# Patient Record
Sex: FEMALE | Race: WHITE | NOT HISPANIC OR LATINO | Employment: OTHER | URBAN - METROPOLITAN AREA
[De-identification: names, ages, dates, MRNs, and addresses within clinical notes are randomized per-mention and may not be internally consistent; named-entity substitution may affect disease eponyms.]

---

## 2020-11-03 ENCOUNTER — HOSPITAL ENCOUNTER (EMERGENCY)
Facility: CLINIC | Age: 70
Discharge: ANOTHER HOSPITAL | End: 2020-11-03
Attending: EMERGENCY MEDICINE | Admitting: EMERGENCY MEDICINE
Payer: COMMERCIAL

## 2020-11-03 ENCOUNTER — HOSPITAL ENCOUNTER (INPATIENT)
Facility: CLINIC | Age: 70
LOS: 1 days | Discharge: HOME | DRG: 066 | End: 2020-11-04
Attending: EMERGENCY MEDICINE | Admitting: PSYCHIATRY & NEUROLOGY
Payer: COMMERCIAL

## 2020-11-03 ENCOUNTER — APPOINTMENT (OUTPATIENT)
Dept: CT IMAGING | Facility: CLINIC | Age: 70
End: 2020-11-03
Attending: EMERGENCY MEDICINE
Payer: COMMERCIAL

## 2020-11-03 ENCOUNTER — APPOINTMENT (OUTPATIENT)
Dept: MRI IMAGING | Facility: CLINIC | Age: 70
DRG: 066 | End: 2020-11-03
Attending: EMERGENCY MEDICINE
Payer: COMMERCIAL

## 2020-11-03 VITALS
OXYGEN SATURATION: 98 % | DIASTOLIC BLOOD PRESSURE: 122 MMHG | TEMPERATURE: 97.4 F | RESPIRATION RATE: 20 BRPM | HEART RATE: 90 BPM | BODY MASS INDEX: 32.44 KG/M2 | SYSTOLIC BLOOD PRESSURE: 213 MMHG | WEIGHT: 189 LBS

## 2020-11-03 DIAGNOSIS — I63.519 CEREBROVASCULAR ACCIDENT (CVA) DUE TO OCCLUSION OF MIDDLE CEREBRAL ARTERY, UNSPECIFIED BLOOD VESSEL LATERALITY (H): Primary | ICD-10-CM

## 2020-11-03 DIAGNOSIS — Z20.828 EXPOSURE TO SARS-ASSOCIATED CORONAVIRUS: ICD-10-CM

## 2020-11-03 DIAGNOSIS — F17.210 CIGARETTE SMOKER: ICD-10-CM

## 2020-11-03 DIAGNOSIS — I10 ACCELERATED HYPERTENSION: ICD-10-CM

## 2020-11-03 DIAGNOSIS — S27.321A CONTUSION OF RIGHT LUNG, INITIAL ENCOUNTER: ICD-10-CM

## 2020-11-03 DIAGNOSIS — I63.9 CEREBROVASCULAR ACCIDENT (CVA), UNSPECIFIED MECHANISM (H): ICD-10-CM

## 2020-11-03 DIAGNOSIS — S27.329A CONTUSION OF LUNG, UNSPECIFIED LATERALITY, INITIAL ENCOUNTER: ICD-10-CM
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
## 2020-11-03 LAB
ALBUMIN SERPL-MCNC: 3.6 G/DL (ref 3.4–5)
ALP SERPL-CCNC: 88 U/L (ref 40–150)
ALT SERPL W P-5'-P-CCNC: 18 U/L (ref 0–50)
ANION GAP SERPL CALCULATED.3IONS-SCNC: 5 MMOL/L (ref 3–14)
APTT PPP: 29 SEC (ref 22–37)
AST SERPL W P-5'-P-CCNC: 18 U/L (ref 0–45)
BASOPHILS # BLD AUTO: 0 10E9/L (ref 0–0.2)
BASOPHILS NFR BLD AUTO: 0.4 %
BILIRUB SERPL-MCNC: 0.8 MG/DL (ref 0.2–1.3)
BUN SERPL-MCNC: 13 MG/DL (ref 7–30)
CALCIUM SERPL-MCNC: 9.1 MG/DL (ref 8.5–10.1)
CHLORIDE SERPL-SCNC: 110 MMOL/L (ref 94–109)
CO2 SERPL-SCNC: 26 MMOL/L (ref 20–32)
CREAT SERPL-MCNC: 0.95 MG/DL (ref 0.52–1.04)
DIFFERENTIAL METHOD BLD: NORMAL
EOSINOPHIL NFR BLD AUTO: 0.9 %
ERYTHROCYTE [DISTWIDTH] IN BLOOD BY AUTOMATED COUNT: 14.1 % (ref 10–15)
ETHANOL SERPL-MCNC: <0.01 G/DL
GFR SERPL CREATININE-BSD FRML MDRD: 60 ML/MIN/{1.73_M2}
GLUCOSE BLDC GLUCOMTR-MCNC: 80 MG/DL (ref 70–99)
GLUCOSE BLDC GLUCOMTR-MCNC: 85 MG/DL (ref 70–99)
GLUCOSE SERPL-MCNC: 98 MG/DL (ref 70–99)
HBA1C MFR BLD: 5.8 % (ref 0–5.6)
HCT VFR BLD AUTO: 45.5 % (ref 35–47)
HGB BLD-MCNC: 14.8 G/DL (ref 11.7–15.7)
IMM GRANULOCYTES # BLD: 0 10E9/L (ref 0–0.4)
IMM GRANULOCYTES NFR BLD: 0.3 %
INR PPP: 0.98 (ref 0.86–1.14)
LYMPHOCYTES # BLD AUTO: 1.3 10E9/L (ref 0.8–5.3)
LYMPHOCYTES NFR BLD AUTO: 18.9 %
MCH RBC QN AUTO: 29.1 PG (ref 26.5–33)
MCHC RBC AUTO-ENTMCNC: 32.5 G/DL (ref 31.5–36.5)
MCV RBC AUTO: 90 FL (ref 78–100)
MONOCYTES # BLD AUTO: 0.4 10E9/L (ref 0–1.3)
MONOCYTES NFR BLD AUTO: 6 %
NEUTROPHILS # BLD AUTO: 4.9 10E9/L (ref 1.6–8.3)
NEUTROPHILS NFR BLD AUTO: 73.5 %
NRBC # BLD AUTO: 0 10*3/UL
NRBC BLD AUTO-RTO: 0 /100
PLATELET # BLD AUTO: 244 10E9/L (ref 150–450)
POTASSIUM SERPL-SCNC: 3.9 MMOL/L (ref 3.4–5.3)
PROT SERPL-MCNC: 7 G/DL (ref 6.8–8.8)
RADIOLOGIST FLAGS: ABNORMAL
RBC # BLD AUTO: 5.08 10E12/L (ref 3.8–5.2)
SODIUM SERPL-SCNC: 141 MMOL/L (ref 133–144)
TROPONIN I SERPL-MCNC: <0.015 UG/L (ref 0–0.04)
TSH SERPL DL<=0.005 MIU/L-ACNC: 1.68 MU/L (ref 0.4–4)
WBC # BLD AUTO: 6.7 10E9/L (ref 4–11)

## 2020-11-03 PROCEDURE — 250N000011 HC RX IP 250 OP 636: Performed by: STUDENT IN AN ORGANIZED HEALTH CARE EDUCATION/TRAINING PROGRAM

## 2020-11-03 PROCEDURE — 99291 CRITICAL CARE FIRST HOUR: CPT | Mod: 25 | Performed by: EMERGENCY MEDICINE

## 2020-11-03 PROCEDURE — 250N000011 HC RX IP 250 OP 636: Performed by: EMERGENCY MEDICINE

## 2020-11-03 PROCEDURE — 250N000013 HC RX MED GY IP 250 OP 250 PS 637: Performed by: STUDENT IN AN ORGANIZED HEALTH CARE EDUCATION/TRAINING PROGRAM

## 2020-11-03 PROCEDURE — 999N001017 HC STATISTIC GLUCOSE BY METER IP

## 2020-11-03 PROCEDURE — 99285 EMERGENCY DEPT VISIT HI MDM: CPT | Mod: 25

## 2020-11-03 PROCEDURE — 85025 COMPLETE CBC W/AUTO DIFF WBC: CPT | Performed by: EMERGENCY MEDICINE

## 2020-11-03 PROCEDURE — 80053 COMPREHEN METABOLIC PANEL: CPT | Performed by: EMERGENCY MEDICINE

## 2020-11-03 PROCEDURE — 83036 HEMOGLOBIN GLYCOSYLATED A1C: CPT | Performed by: EMERGENCY MEDICINE

## 2020-11-03 PROCEDURE — 120N000002 HC R&B MED SURG/OB UMMC

## 2020-11-03 PROCEDURE — U0003 INFECTIOUS AGENT DETECTION BY NUCLEIC ACID (DNA OR RNA); SEVERE ACUTE RESPIRATORY SYNDROME CORONAVIRUS 2 (SARS-COV-2) (CORONAVIRUS DISEASE [COVID-19]), AMPLIFIED PROBE TECHNIQUE, MAKING USE OF HIGH THROUGHPUT TECHNOLOGIES AS DESCRIBED BY CMS-2020-01-R: HCPCS | Performed by: EMERGENCY MEDICINE

## 2020-11-03 PROCEDURE — 250N000011 HC RX IP 250 OP 636: Performed by: RADIOLOGY

## 2020-11-03 PROCEDURE — 84484 ASSAY OF TROPONIN QUANT: CPT | Performed by: EMERGENCY MEDICINE

## 2020-11-03 PROCEDURE — 70450 CT HEAD/BRAIN W/O DYE: CPT

## 2020-11-03 PROCEDURE — 71260 CT THORAX DX C+: CPT

## 2020-11-03 PROCEDURE — 99221 1ST HOSP IP/OBS SF/LOW 40: CPT | Performed by: PHYSICIAN ASSISTANT

## 2020-11-03 PROCEDURE — 85610 PROTHROMBIN TIME: CPT | Performed by: EMERGENCY MEDICINE

## 2020-11-03 PROCEDURE — 70551 MRI BRAIN STEM W/O DYE: CPT | Mod: 26 | Performed by: RADIOLOGY

## 2020-11-03 PROCEDURE — 80320 DRUG SCREEN QUANTALCOHOLS: CPT | Performed by: EMERGENCY MEDICINE

## 2020-11-03 PROCEDURE — 70551 MRI BRAIN STEM W/O DYE: CPT

## 2020-11-03 PROCEDURE — 250N000009 HC RX 250: Performed by: RADIOLOGY

## 2020-11-03 PROCEDURE — 96374 THER/PROPH/DIAG INJ IV PUSH: CPT | Mod: 59 | Performed by: EMERGENCY MEDICINE

## 2020-11-03 PROCEDURE — 93005 ELECTROCARDIOGRAM TRACING: CPT | Performed by: EMERGENCY MEDICINE

## 2020-11-03 PROCEDURE — 84443 ASSAY THYROID STIM HORMONE: CPT | Performed by: EMERGENCY MEDICINE

## 2020-11-03 PROCEDURE — 85730 THROMBOPLASTIN TIME PARTIAL: CPT | Performed by: EMERGENCY MEDICINE

## 2020-11-03 PROCEDURE — 99285 EMERGENCY DEPT VISIT HI MDM: CPT | Mod: 25 | Performed by: EMERGENCY MEDICINE

## 2020-11-03 PROCEDURE — 96376 TX/PRO/DX INJ SAME DRUG ADON: CPT | Performed by: EMERGENCY MEDICINE

## 2020-11-03 PROCEDURE — 36415 COLL VENOUS BLD VENIPUNCTURE: CPT | Performed by: EMERGENCY MEDICINE

## 2020-11-03 PROCEDURE — 93010 ELECTROCARDIOGRAM REPORT: CPT | Performed by: EMERGENCY MEDICINE

## 2020-11-03 PROCEDURE — 99285 EMERGENCY DEPT VISIT HI MDM: CPT | Performed by: EMERGENCY MEDICINE

## 2020-11-03 PROCEDURE — 70496 CT ANGIOGRAPHY HEAD: CPT

## 2020-11-03 PROCEDURE — 250N000009 HC RX 250: Performed by: STUDENT IN AN ORGANIZED HEALTH CARE EDUCATION/TRAINING PROGRAM

## 2020-11-03 RX ORDER — POLYETHYLENE GLYCOL 3350 17 G/17G
17 POWDER, FOR SOLUTION ORAL DAILY
Status: DISCONTINUED | OUTPATIENT
Start: 2020-11-03 | End: 2020-11-03

## 2020-11-03 RX ORDER — SODIUM CHLORIDE, SODIUM GLUCONATE, SODIUM ACETATE, POTASSIUM CHLORIDE AND MAGNESIUM CHLORIDE 526; 502; 368; 37; 30 MG/100ML; MG/100ML; MG/100ML; MG/100ML; MG/100ML
83 INJECTION, SOLUTION INTRAVENOUS CONTINUOUS
Status: DISCONTINUED | OUTPATIENT
Start: 2020-11-03 | End: 2020-11-04 | Stop reason: HOSPADM

## 2020-11-03 RX ORDER — AMOXICILLIN 250 MG
1 CAPSULE ORAL 2 TIMES DAILY
Status: CANCELLED | OUTPATIENT
Start: 2020-11-03

## 2020-11-03 RX ORDER — HEPARIN SODIUM 5000 [USP'U]/.5ML
5000 INJECTION, SOLUTION INTRAVENOUS; SUBCUTANEOUS EVERY 8 HOURS
Status: DISCONTINUED | OUTPATIENT
Start: 2020-11-03 | End: 2020-11-04 | Stop reason: HOSPADM

## 2020-11-03 RX ORDER — POLYETHYLENE GLYCOL 3350 17 G/17G
17 POWDER, FOR SOLUTION ORAL DAILY
Status: DISCONTINUED | OUTPATIENT
Start: 2020-11-04 | End: 2020-11-04 | Stop reason: HOSPADM

## 2020-11-03 RX ORDER — IOPAMIDOL 755 MG/ML
500 INJECTION, SOLUTION INTRAVASCULAR ONCE
Status: COMPLETED | OUTPATIENT
Start: 2020-11-03 | End: 2020-11-03

## 2020-11-03 RX ORDER — AMOXICILLIN 250 MG
2 CAPSULE ORAL 2 TIMES DAILY
Status: CANCELLED | OUTPATIENT
Start: 2020-11-03

## 2020-11-03 RX ORDER — LABETALOL 20 MG/4 ML (5 MG/ML) INTRAVENOUS SYRINGE
10-20 EVERY 10 MIN PRN
Status: DISCONTINUED | OUTPATIENT
Start: 2020-11-03 | End: 2020-11-04 | Stop reason: HOSPADM

## 2020-11-03 RX ORDER — ASPIRIN 300 MG/1
300 SUPPOSITORY RECTAL ONCE
Status: COMPLETED | OUTPATIENT
Start: 2020-11-03 | End: 2020-11-03

## 2020-11-03 RX ORDER — HYDRALAZINE HYDROCHLORIDE 20 MG/ML
10 INJECTION INTRAMUSCULAR; INTRAVENOUS
Status: DISCONTINUED | OUTPATIENT
Start: 2020-11-03 | End: 2020-11-03 | Stop reason: HOSPADM

## 2020-11-03 RX ADMIN — ASPIRIN 300 MG: 300 SUPPOSITORY RECTAL at 19:37

## 2020-11-03 RX ADMIN — SODIUM CHLORIDE, SODIUM GLUCONATE, SODIUM ACETATE, POTASSIUM CHLORIDE AND MAGNESIUM CHLORIDE 83 ML/HR: 526; 502; 368; 37; 30 INJECTION, SOLUTION INTRAVENOUS at 22:45

## 2020-11-03 RX ADMIN — HEPARIN SODIUM 5000 UNITS: 5000 INJECTION, SOLUTION INTRAVENOUS; SUBCUTANEOUS at 20:51

## 2020-11-03 RX ADMIN — HYDRALAZINE HYDROCHLORIDE 10 MG: 20 INJECTION INTRAMUSCULAR; INTRAVENOUS at 12:52

## 2020-11-03 RX ADMIN — SODIUM CHLORIDE 100 ML: 9 INJECTION, SOLUTION INTRAVENOUS at 12:18

## 2020-11-03 RX ADMIN — HYDRALAZINE HYDROCHLORIDE 10 MG: 20 INJECTION INTRAMUSCULAR; INTRAVENOUS at 13:57

## 2020-11-03 RX ADMIN — IOPAMIDOL 95 ML: 755 INJECTION, SOLUTION INTRAVENOUS at 12:18
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
## 2020-11-03 NOTE — ED NOTES
Dr. Smith updated with patient's BP status. meds and overall status. Awaiting transfer. Shira Judd RN
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
## 2020-11-03 NOTE — ED NOTES
Patient and daughter, Krysta updated with lab results and CT results pending. She has no further sx, speech remains slurred. Shira Judd RN

## 2020-11-04 ENCOUNTER — APPOINTMENT (OUTPATIENT)
Dept: CARDIOLOGY | Facility: CLINIC | Age: 70
DRG: 066 | End: 2020-11-04
Attending: STUDENT IN AN ORGANIZED HEALTH CARE EDUCATION/TRAINING PROGRAM
Payer: COMMERCIAL

## 2020-11-04 ENCOUNTER — APPOINTMENT (OUTPATIENT)
Dept: CARDIOLOGY | Facility: CLINIC | Age: 70
DRG: 066 | End: 2020-11-04
Payer: COMMERCIAL

## 2020-11-04 ENCOUNTER — APPOINTMENT (OUTPATIENT)
Dept: OCCUPATIONAL THERAPY | Facility: CLINIC | Age: 70
DRG: 066 | End: 2020-11-04
Payer: COMMERCIAL

## 2020-11-04 ENCOUNTER — APPOINTMENT (OUTPATIENT)
Dept: PHYSICAL THERAPY | Facility: CLINIC | Age: 70
DRG: 066 | End: 2020-11-04
Payer: COMMERCIAL

## 2020-11-04 ENCOUNTER — PATIENT OUTREACH (OUTPATIENT)
Dept: CARE COORDINATION | Facility: CLINIC | Age: 70
End: 2020-11-04

## 2020-11-04 ENCOUNTER — APPOINTMENT (OUTPATIENT)
Dept: SPEECH THERAPY | Facility: CLINIC | Age: 70
DRG: 066 | End: 2020-11-04
Payer: COMMERCIAL

## 2020-11-04 VITALS
OXYGEN SATURATION: 100 % | BODY MASS INDEX: 31.45 KG/M2 | DIASTOLIC BLOOD PRESSURE: 83 MMHG | HEART RATE: 83 BPM | TEMPERATURE: 98.9 F | RESPIRATION RATE: 16 BRPM | SYSTOLIC BLOOD PRESSURE: 156 MMHG | HEIGHT: 65 IN

## 2020-11-04 LAB
ALBUMIN SERPL-MCNC: 3 G/DL (ref 3.4–5)
ALP SERPL-CCNC: 79 U/L (ref 40–150)
ALT SERPL W P-5'-P-CCNC: 18 U/L (ref 0–50)
ANION GAP SERPL CALCULATED.3IONS-SCNC: 8 MMOL/L (ref 3–14)
AST SERPL W P-5'-P-CCNC: 19 U/L (ref 0–45)
BILIRUB SERPL-MCNC: 0.8 MG/DL (ref 0.2–1.3)
BUN SERPL-MCNC: 13 MG/DL (ref 7–30)
CALCIUM SERPL-MCNC: 8.8 MG/DL (ref 8.5–10.1)
CHLORIDE SERPL-SCNC: 112 MMOL/L (ref 94–109)
CHOLEST SERPL-MCNC: 169 MG/DL
CO2 SERPL-SCNC: 22 MMOL/L (ref 20–32)
CREAT SERPL-MCNC: 0.89 MG/DL (ref 0.52–1.04)
GFR SERPL CREATININE-BSD FRML MDRD: 66 ML/MIN/{1.73_M2}
GLUCOSE BLDC GLUCOMTR-MCNC: 107 MG/DL (ref 70–99)
GLUCOSE BLDC GLUCOMTR-MCNC: 147 MG/DL (ref 70–99)
GLUCOSE BLDC GLUCOMTR-MCNC: 71 MG/DL (ref 70–99)
GLUCOSE BLDC GLUCOMTR-MCNC: 78 MG/DL (ref 70–99)
GLUCOSE BLDC GLUCOMTR-MCNC: 82 MG/DL (ref 70–99)
GLUCOSE SERPL-MCNC: 80 MG/DL (ref 70–99)
HDLC SERPL-MCNC: 49 MG/DL
LDLC SERPL CALC-MCNC: 104 MG/DL
NONHDLC SERPL-MCNC: 120 MG/DL
POTASSIUM SERPL-SCNC: 3.8 MMOL/L (ref 3.4–5.3)
PROT SERPL-MCNC: 6.2 G/DL (ref 6.8–8.8)
SARS-COV-2 RNA SPEC QL NAA+PROBE: NOT DETECTED
SODIUM SERPL-SCNC: 142 MMOL/L (ref 133–144)
SPECIMEN SOURCE: NORMAL
TRIGL SERPL-MCNC: 81 MG/DL

## 2020-11-04 PROCEDURE — 80053 COMPREHEN METABOLIC PANEL: CPT | Performed by: PSYCHIATRY & NEUROLOGY

## 2020-11-04 PROCEDURE — 250N000013 HC RX MED GY IP 250 OP 250 PS 637: Performed by: PSYCHIATRY & NEUROLOGY

## 2020-11-04 PROCEDURE — 36415 COLL VENOUS BLD VENIPUNCTURE: CPT | Performed by: PSYCHIATRY & NEUROLOGY

## 2020-11-04 PROCEDURE — 97161 PT EVAL LOW COMPLEX 20 MIN: CPT | Mod: GP

## 2020-11-04 PROCEDURE — 250N000013 HC RX MED GY IP 250 OP 250 PS 637: Performed by: STUDENT IN AN ORGANIZED HEALTH CARE EDUCATION/TRAINING PROGRAM

## 2020-11-04 PROCEDURE — 258N000001 HC RX 258: Performed by: STUDENT IN AN ORGANIZED HEALTH CARE EDUCATION/TRAINING PROGRAM

## 2020-11-04 PROCEDURE — 97165 OT EVAL LOW COMPLEX 30 MIN: CPT | Mod: GO | Performed by: OCCUPATIONAL THERAPIST

## 2020-11-04 PROCEDURE — 999N001017 HC STATISTIC GLUCOSE BY METER IP

## 2020-11-04 PROCEDURE — 255N000002 HC RX 255 OP 636: Performed by: STUDENT IN AN ORGANIZED HEALTH CARE EDUCATION/TRAINING PROGRAM

## 2020-11-04 PROCEDURE — 99223 1ST HOSP IP/OBS HIGH 75: CPT | Mod: GC | Performed by: PSYCHIATRY & NEUROLOGY

## 2020-11-04 PROCEDURE — 97116 GAIT TRAINING THERAPY: CPT | Mod: GP

## 2020-11-04 PROCEDURE — 97530 THERAPEUTIC ACTIVITIES: CPT | Mod: GO | Performed by: OCCUPATIONAL THERAPIST

## 2020-11-04 PROCEDURE — 80061 LIPID PANEL: CPT | Performed by: PSYCHIATRY & NEUROLOGY

## 2020-11-04 PROCEDURE — 93270 REMOTE 30 DAY ECG REV/REPORT: CPT

## 2020-11-04 PROCEDURE — 93228 REMOTE 30 DAY ECG REV/REPORT: CPT | Performed by: INTERNAL MEDICINE

## 2020-11-04 PROCEDURE — 92507 TX SP LANG VOICE COMM INDIV: CPT | Mod: GN

## 2020-11-04 PROCEDURE — 99232 SBSQ HOSP IP/OBS MODERATE 35: CPT | Performed by: PHYSICIAN ASSISTANT

## 2020-11-04 PROCEDURE — 93306 TTE W/DOPPLER COMPLETE: CPT | Mod: 26 | Performed by: INTERNAL MEDICINE

## 2020-11-04 PROCEDURE — 99407 BEHAV CHNG SMOKING > 10 MIN: CPT

## 2020-11-04 PROCEDURE — 92610 EVALUATE SWALLOWING FUNCTION: CPT | Mod: GN

## 2020-11-04 PROCEDURE — 97535 SELF CARE MNGMENT TRAINING: CPT | Mod: GO | Performed by: OCCUPATIONAL THERAPIST

## 2020-11-04 PROCEDURE — 999N000033 HC STATISTIC CHRONIC PULMONARY DISEASE SPECIALIST

## 2020-11-04 PROCEDURE — 92523 SPEECH SOUND LANG COMPREHEN: CPT | Mod: GN

## 2020-11-04 PROCEDURE — 250N000011 HC RX IP 250 OP 636: Performed by: STUDENT IN AN ORGANIZED HEALTH CARE EDUCATION/TRAINING PROGRAM

## 2020-11-04 RX ORDER — ATORVASTATIN CALCIUM 40 MG/1
40 TABLET, FILM COATED ORAL EVERY EVENING
Status: DISCONTINUED | OUTPATIENT
Start: 2020-11-04 | End: 2020-11-04 | Stop reason: HOSPADM

## 2020-11-04 RX ORDER — ATORVASTATIN CALCIUM 40 MG/1
40 TABLET, FILM COATED ORAL EVERY EVENING
Qty: 30 TABLET | Refills: 1 | Status: SHIPPED | OUTPATIENT
Start: 2020-11-04 | End: 2020-12-07

## 2020-11-04 RX ORDER — ASPIRIN 81 MG/1
81 TABLET, CHEWABLE ORAL DAILY
Qty: 30 TABLET | Refills: 1 | Status: SHIPPED | OUTPATIENT
Start: 2020-11-05 | End: 2020-12-07

## 2020-11-04 RX ORDER — LISINOPRIL 5 MG/1
5 TABLET ORAL DAILY
Status: DISCONTINUED | OUTPATIENT
Start: 2020-11-04 | End: 2020-11-04 | Stop reason: HOSPADM

## 2020-11-04 RX ORDER — CLOPIDOGREL BISULFATE 75 MG/1
75 TABLET ORAL DAILY
Status: DISCONTINUED | OUTPATIENT
Start: 2020-11-05 | End: 2020-11-04 | Stop reason: HOSPADM

## 2020-11-04 RX ORDER — NICOTINE 21 MG/24HR
1 PATCH, TRANSDERMAL 24 HOURS TRANSDERMAL EVERY 24 HOURS
Qty: 30 PATCH | Refills: 11 | Status: SHIPPED | OUTPATIENT
Start: 2020-11-04 | End: 2020-11-24

## 2020-11-04 RX ORDER — CLOPIDOGREL BISULFATE 75 MG/1
75 TABLET ORAL DAILY
Qty: 30 TABLET | Refills: 1 | Status: SHIPPED | OUTPATIENT
Start: 2020-11-05 | End: 2020-12-07

## 2020-11-04 RX ORDER — CLOPIDOGREL BISULFATE 75 MG/1
300 TABLET ORAL ONCE
Status: COMPLETED | OUTPATIENT
Start: 2020-11-04 | End: 2020-11-04

## 2020-11-04 RX ORDER — NICOTINE POLACRILEX 4 MG
15-30 LOZENGE BUCCAL
Status: DISCONTINUED | OUTPATIENT
Start: 2020-11-04 | End: 2020-11-04 | Stop reason: HOSPADM

## 2020-11-04 RX ORDER — ACYCLOVIR 200 MG/1
10 CAPSULE ORAL ONCE
Status: COMPLETED | OUTPATIENT
Start: 2020-11-04 | End: 2020-11-04

## 2020-11-04 RX ORDER — LISINOPRIL 5 MG/1
5 TABLET ORAL DAILY
Qty: 30 TABLET | Refills: 1 | Status: SHIPPED | OUTPATIENT
Start: 2020-11-04 | End: 2020-11-24

## 2020-11-04 RX ORDER — ASPIRIN 81 MG/1
81 TABLET, CHEWABLE ORAL DAILY
Status: DISCONTINUED | OUTPATIENT
Start: 2020-11-04 | End: 2020-11-04 | Stop reason: HOSPADM

## 2020-11-04 RX ORDER — DEXTROSE MONOHYDRATE 25 G/50ML
25-50 INJECTION, SOLUTION INTRAVENOUS
Status: DISCONTINUED | OUTPATIENT
Start: 2020-11-04 | End: 2020-11-04 | Stop reason: HOSPADM

## 2020-11-04 RX ORDER — POLYETHYLENE GLYCOL 3350 17 G
2 POWDER IN PACKET (EA) ORAL
Qty: 30 LOZENGE | Refills: 11 | Status: SHIPPED | OUTPATIENT
Start: 2020-11-04 | End: 2020-11-24

## 2020-11-04 RX ADMIN — LISINOPRIL 5 MG: 5 TABLET ORAL at 12:53

## 2020-11-04 RX ADMIN — CLOPIDOGREL BISULFATE 300 MG: 75 TABLET ORAL at 10:06

## 2020-11-04 RX ADMIN — POLYETHYLENE GLYCOL 3350 17 G: 17 POWDER, FOR SOLUTION ORAL at 10:06

## 2020-11-04 RX ADMIN — HEPARIN SODIUM 5000 UNITS: 5000 INJECTION, SOLUTION INTRAVENOUS; SUBCUTANEOUS at 12:54

## 2020-11-04 RX ADMIN — SODIUM CHLORIDE 10 ML: 9 INJECTION, SOLUTION INTRAMUSCULAR; INTRAVENOUS; SUBCUTANEOUS at 10:32

## 2020-11-04 RX ADMIN — HUMAN ALBUMIN MICROSPHERES AND PERFLUTREN 5 ML: 10; .22 INJECTION, SOLUTION INTRAVENOUS at 11:00

## 2020-11-04 RX ADMIN — HEPARIN SODIUM 5000 UNITS: 5000 INJECTION, SOLUTION INTRAVENOUS; SUBCUTANEOUS at 04:31

## 2020-11-04 RX ADMIN — ASPIRIN 81 MG: 81 TABLET, CHEWABLE ORAL at 10:06

## 2020-11-04 ASSESSMENT — ACTIVITIES OF DAILY LIVING (ADL)
PREVIOUS_RESPONSIBILITIES: MEAL PREP;HOUSEKEEPING;LAUNDRY;SHOPPING;YARDWORK;MEDICATION MANAGEMENT;FINANCES;DRIVING
ADLS_ACUITY_SCORE: 17
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
## 2020-11-04 NOTE — PLAN OF CARE
Your risk factors for stroke or TIA (transient ischemic attack):    Your Risk Factors Your Results Normal Ranges   High blood pressure BP Readings from Last 1 Encounters:   11/04/20 (!) 157/98    Less than 120/80   Cholesterol              Total Lab Results   Component Value Date    CHOL 169 11/04/2020      Less than 150    Triglycerides   Lab Results   Component Value Date    TRIG 81 11/04/2020    Less than 150   LDL Lab Results   Component Value Date     11/04/2020       Less than 70   HDL Lab Results   Component Value Date    HDL 49 11/04/2020            Greater than 40 (men)  Greater than 50 (women)   Diabetes Recent Labs   Lab 11/04/20  0631   GLC 80    Fasting blood glucose    Smoking/tobacco use  Quit smoking and tobacco   Overweight  Lose 1-2 pounds a week   Lack of exercise  30 minutes moderate activity each day   Other risk factors include carotid (neck) artery disease, atrial fibrillation and stress. You may be on new medicine to treat high blood pressure, cholesterol, diabetes or atrial fibrillation.    Understanding Stroke Booklet given to patient. Please refer to booklet for further information.    Stroke warning signs and symptoms - CALL 911 right away for:  - Sudden numbness or weakness in the face, arm or leg (often on one side of the body).  - Sudden confusion or trouble understanding what is going on.  - Sudden blurred or decreased vision in one or both eyes.  - Sudden trouble speaking, loss of balance, dizziness or problems with coordination.  - Sudden, severe headache for no reason.  - Fainting or seizures.  - Symptoms may go away then come back suddenly.       **Given to patient and discussed** Patient's specific risk factors include HTN, smoking, triglycerides, hyperlipidemia, overweight
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
## 2020-11-04 NOTE — CONSULTS
Brief Social Work Note    SW consult seen and acknowledged for discharge planning. Per PT/OT recommendations, pt is able to safely return home with OP PT/OT. No SW needs identified at this time. Please re-consult if further needs arise.    ENA Burris, [facility]  Adult Acute Care SW  Ph:[phone]  Pager [phone]
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
## 2020-11-04 NOTE — PROGRESS NOTES
Neuros unchanged. Discharge orders placed. PIV removed. Education gone over w/ pt and . Medications to be picked up in discharge pharmacy. EKG monitor placed before pt left.  to be 24 hr assist. W/c ride taken to front entrance. Son was to drive pt and  home.

## 2020-11-04 NOTE — PROGRESS NOTES
Successfully completed stroke teach-back, Session 1. Patient/family has the Stroke Handbook and/or handouts from the [facility] stroke class.
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
## 2020-11-04 NOTE — PLAN OF CARE
Stroke Education Note    The following information has been reviewed with the patient:    1. Warning signs of stroke    2. Calling 911 if having warning signs of stroke    3. All modifiable risk factors: hypertension, CAD, atrial fib, diabetes, hypercholesterolemia, smoking, substance abuse, diet, physical inactivity, obesity, sleep apnea    4. Patient's risk factors for stroke which include: HTN, smoking, hypercholesterolemia,diet, physical inactivity, obesity    5. Follow-up plan for after discharge: Home w/24hr assist from family w/outpatient PT/OT/Speech    6. Discharge medications which include: Lisinopril, Atorvastatin, Aspirin, Plavix    In addition, the Understanding Stroke Handbook has been given to the patient.    Learner's response to risk factors / lifestyle modification education: Desire to change     Attempted to call patient's son and  to review information w/no success    Karine Blair RN

## 2020-11-04 NOTE — PROGRESS NOTES
[facility]     Stroke Progress Note    Interval Events  No acute overnight events  High systolic Bps (allowing permissive HTN)  Trauma signing off    Impression  Ischemic Stroke due to small-vessel occlusion  (Acute)  Previously unknown stroke, possibly due to embolism    Plan    #Acute Ischemic Stroke (without tPA) Plan, Left thalamic infarct  #Previously infarct of L. Frontal lobe, undetermined age, likely embolic  - Neurochecks Q 2 hours  - SBP goal < 140  - Avoid hypotonic IV fluids  - Statin:  - TTE with Bubble Study  - EKG (outside hospital)   -left atrial enlargement read  - Bedside Glucose Monitoring  - PT/OT/SLP  - Atorvastatin 40 mg at bedtime  - Asa 81 mg and clopidogrel 75 mg  qday for 21 days; ASA 81 mg qday thereafter  - 30 day cardiac monitoring on discharge  - Stroke Education  - Stroke Class per [facility] ([facility]  - Smoking cessation consult  - Depression Screen  - Apnea Screen  - Euthermia, Euglycemia    #HTN  Previously diagnosed but untreated  - Lisinopril 5mg as above    #HLD  - Atorvastatin 40 mg as above    Checklist:  FEN: Regular Diet, thin liquid per SLP  PPx:   - DVT: subQ heparin, SCDs  Disposition: Pending PT/OT recs  Code: Full    The patient was discussed with Stroke Fellow, Dr. Dennis.  The Stroke Staff is Dr. Joseph.    Marty Martin MD  Neurology Resident    ______________________________________________________    Medications   Home Meds  Prior to Admission medications    Not on File       Scheduled Meds    heparin ANTICOAGULANT  5,000 Units Subcutaneous Q8H     polyethylene glycol  17 g Oral or Feeding Tube Daily       Infusion Meds    - MEDICATION INSTRUCTIONS -       Plasma-Lyte A 83 mL/hr (11/03/20 9387)       PRN Meds  glucose **OR** dextrose **OR** glucagon, labetalol, - MEDICATION INSTRUCTIONS -       PHYSICAL EXAMINATION  Temp:  [97.4  F (36.3  C)-98.8  F (37.1  C)] 97.5  F (36.4  C)  Pulse:  [] 75  Resp:   [6-20] 18  BP: (137-213)/() 185/105  SpO2:  [94 %-99 %] 95 %     General:  patient lying in bed without any acute distress    HEENT:  normocephalic/atraumatic, poor dental hygiene, no epistaxis   Cardio:  RRR  Pulmonary:  no respiratory distress  Abdomen:  soft, non-tender, non-distended  Extremities:  No gross deformities  Skin:  intact, warm/dry      Neurologic  Mental Status:  alert, oriented x 3, follows commands, aphasic (expressive) speech, good vocal quality.  Cranial Nerves:  visual fields intact, PERRL, EOMI with normal pursuit, facial sensation intact and symmetric, hearing not formally tested but intact to conversation, palate elevation symmetric and uvula midline, no dysarthria, shoulder shrug strong bilaterally, tongue protrusion midline, minor right facial droop at corner of mouth.  Motor:  normal muscle tone and bulk, no abnormal movements, able to move all limbs spontaneously, strength 4+/5 on right shoulder abduction/adduction and right elbow flexion/extenion. 5/5 throughout remainder of upper and lower extremities, no pronator drift.  Reflexes:  2+ and symmetric throughout f upper and lower extremities, no clonus, not able to test toes due to pt cooperation.  Sensory:  light touch sensation intact and symmetric throughout upper and lower extremities  Coordination:  normal finger-to-nose and heel-to-shin bilaterally without dysmetria, rapid alternating movements symmetric  Station/Gait:  deferred    Stroke Scales    NIHSS  Interval baseline (11/03/20 1650)   Interval Comments     1a. Level of Consciousness 0-->Alert, keenly responsive   1b. LOC Questions 0-->Answers both questions correctly   1c. LOC Commands 0-->Performs both tasks correctly   2.   Best Gaze 0-->Normal   3.   Visual 0-->No visual loss   4.   Facial Palsy 1-->Minor paralysis (flattened nasolabial fold, asymmetry on smiling)   5a. Motor Arm, Left 0-->No drift, limb holds 90 (or 45) degrees for full 10 secs   5b. Motor Arm,  Right 0-->No drift, limb holds 90 (or 45) degrees for full 10 secs   6a. Motor Leg, Left 0-->No drift, leg holds 30 degree position for full 5 secs   6b. Motor Leg, right 0-->No drift, leg holds 30 degree position for full 5 secs   7.   Limb Ataxia 0-->Absent   8.   Sensory 0-->Normal, no sensory loss   9.   Best Language 1-->Mild-to-moderate aphasia, some obvious loss of fluency or facility of comprehension, without significant limitation on ideas expressed or form of expression. Reduction of speech and/or comprehension, however, makes conversation. . . (see row details)   10. Dysarthria 0-->Normal   11. Extinction and Inattention  0-->No abnormality   Total 2 (11/03/20 1650)       Imaging  I personally reviewed all imaging; relevant findings per HPI.     Lab Results Data   CBC  Recent Labs   Lab 11/03/20  1150   WBC 6.7   RBC 5.08   HGB 14.8   HCT 45.5        Basic Metabolic Panel    Recent Labs   Lab 11/04/20  0631 11/03/20  1150    141   POTASSIUM 3.8 3.9   CHLORIDE 112* 110*   CO2 22 26   BUN 13 13   CR 0.89 0.95   GLC 80 98   JOSH 8.8 9.1     Liver Panel  Recent Labs   Lab 11/04/20  0631 11/03/20  1150   PROTTOTAL 6.2* 7.0   ALBUMIN 3.0* 3.6   BILITOTAL 0.8 0.8   ALKPHOS 79 88   AST 19 18   ALT 18 18     INR    Recent Labs   Lab Test 11/03/20  1150   INR 0.98      Lipid Profile    Recent Labs   Lab Test 11/04/20  0631   CHOL 169   HDL 49*   *   TRIG 81     A1C    Recent Labs   Lab Test 11/03/20  1547   A1C 5.8*     Troponin I    Recent Labs   Lab 11/03/20  1150   TROPI <0.015
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
## 2020-11-04 NOTE — PLAN OF CARE
Status: Admitted for thalmic stroke  VS: VSS on RA ex HTN w/in parameters  Neuros: A&Ox4. Mild aphasia w/word-finding difficulty; dysarthric speech. R side slightly weaker than L, 5/5 TO. Denies n/t. Some R neglect when walking  GI: Tolerating regular diet, some coughing w/thins, no straw recc. No BM this shift, BS+  : Voiding w/out difficulty  IV: PIV SL  Activity: SBA w/gb and walker. Walked on unit x2  Pain: Denies  Skin: WNL  Labs/Tests: Worked w/PT/OT/Speech. Echo completed. Stroke education completed at bedside, see notes  Social: Son updated via phone  Plan of care: Likely discharge home today.
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
## 2020-11-05 ENCOUNTER — TELEPHONE (OUTPATIENT)
Dept: RESPIRATORY THERAPY | Facility: CLINIC | Age: 70
End: 2020-11-05

## 2020-11-05 NOTE — TELEPHONE ENCOUNTER
Smoking Cessation Counseling Follow-Up Phone Call    Patient provided Smoking Cessation Consult during last hospitalization on 11/4/2020  Called patient today for smoking cessation counseling follow-up support.  answered the phone as patient was resting.  confirmed patient was discharged with nicotine patches and lozenges. States patient continues to be smoke free.     Encouraged patient to continue to:  -Use 14 mg patch daily for 2-6 weeks of smoking abstinence based on assessment of patients dependence level, then taper every 2-4 weeks in 7 mg steps as tolerated.   -Use 2 mg lozenges , take first thing in the morning and/as needed for cravings and urges to smoke. My be used every 1-2 hours.     Patient agreed to another monthly follow-up phone call.     Humera Sandhu RRT, CTTS  Chronic Pulmonary Disease Specialist  Office: [phone]  Pager: [phone]

## 2020-11-05 NOTE — PLAN OF CARE
Physical Therapy Discharge Summary    Reason for therapy discharge:    Discharged to home with outpatient therapy.    Progress towards therapy goal(s). See goals on Care Plan in [facility] electronic health record for goal details.  Goals partially met.  Barriers to achieving goals:   discharge from facility.    Therapy recommendation(s):    Continued therapy is recommended.  Rationale/Recommendations:  to progress functional mobility towards PLOF.
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
## 2020-11-05 NOTE — PLAN OF CARE
Speech Language Therapy Discharge Summary    Reason for therapy discharge:    Discharged to home with outpatient therapy.    Progress towards therapy goal(s). See goals on Care Plan in [facility] electronic health record for goal details.  Goals partially met.  Barriers to achieving goals:   discharge from facility.    Therapy recommendation(s):    Continued therapy is recommended.  Rationale/Recommendations:  Recommend ongoing ST targeting minimal-mild aphasia and moderate dysarthria. Also recommend f/u for dysphagia (regular diet/thin liquids).
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
## 2020-11-10 ENCOUNTER — HOSPITAL ENCOUNTER (OUTPATIENT)
Dept: SPEECH THERAPY | Facility: CLINIC | Age: 70
Setting detail: THERAPIES SERIES
End: 2020-11-10
Attending: STUDENT IN AN ORGANIZED HEALTH CARE EDUCATION/TRAINING PROGRAM
Payer: MEDICARE

## 2020-11-10 DIAGNOSIS — I63.519 CEREBROVASCULAR ACCIDENT (CVA) DUE TO OCCLUSION OF MIDDLE CEREBRAL ARTERY, UNSPECIFIED BLOOD VESSEL LATERALITY (H): ICD-10-CM

## 2020-11-10 PROCEDURE — 92523 SPEECH SOUND LANG COMPREHEN: CPT | Mod: GN | Performed by: SPEECH-LANGUAGE PATHOLOGIST

## 2020-11-10 NOTE — PROGRESS NOTES
[facility]          OUTPATIENT SPEECH LANGUAGE PATHOLOGY LANGUAGE-COGNITION  EVALUATION  PLAN OF TREATMENT FOR OUTPATIENT REHABILITATION  (COMPLETE FOR INITIAL CLAIMS ONLY)  Patient's Last Name, First Name, M.I.  YOB: 1950  Tiara Smith                        Provider s Name: [facility] Medical Record No.  [id number]     Onset Date:  11/03/20   Start of Care Date: 11/10/20   Type:     ___PT  __OT   _X_SLP    Medical Diagnosis:  Cerebrovascular accident (CVA) due to occlusion of middle cerebral artery, unspecified blood vessel laterality (H) I63.519     Speech Language Pathology Diagnosis:  mild expressive aphasia    Visits from SOC: 1                                        ________________________________________________________________________________  Plan of Treatment/Functional Goals:   Planned Therapy Interventions: Language   Intervention Comments: Patient will benefit from skilled speech-language therapy in order to  increase word finding abilities in order to functionally communicate across various settings and communication partners.        Language / Cognition Goals  1. Goal Identifier: Word finding       Goal Description: Patient will complete moderate level word finding task using compensatory word finding strategies with up to 90% accuracy given minimal cues.       Target Date: 12/24/20         Predicted Duration of Therapy Intervention (days/wks): 6 weeks    Megan Fowler, SLP       I CERTIFY THE NEED FOR THESE SERVICES FURNISHED UNDER        THIS PLAN OF TREATMENT AND WHILE UNDER MY CARE     (Physician co-signature of this document indicates review and certification of the therapy plan).                  Certification Date From:  11/10/20  Certification Date To:   12/24/20          Referring Physician:  Yan Ash MD    Initial  Assessment        See Epic Evaluation Start Of Care Date: 11/10/20
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
## 2020-11-11 ENCOUNTER — HOSPITAL ENCOUNTER (OUTPATIENT)
Dept: SPEECH THERAPY | Facility: CLINIC | Age: 70
Setting detail: THERAPIES SERIES
End: 2020-11-11
Attending: STUDENT IN AN ORGANIZED HEALTH CARE EDUCATION/TRAINING PROGRAM
Payer: MEDICARE

## 2020-11-11 ENCOUNTER — HOSPITAL ENCOUNTER (OUTPATIENT)
Dept: PHYSICAL THERAPY | Facility: CLINIC | Age: 70
Setting detail: THERAPIES SERIES
End: 2020-11-11
Attending: STUDENT IN AN ORGANIZED HEALTH CARE EDUCATION/TRAINING PROGRAM
Payer: MEDICARE

## 2020-11-11 DIAGNOSIS — I63.519 CEREBROVASCULAR ACCIDENT (CVA) DUE TO OCCLUSION OF MIDDLE CEREBRAL ARTERY, UNSPECIFIED BLOOD VESSEL LATERALITY (H): ICD-10-CM

## 2020-11-11 PROCEDURE — 97161 PT EVAL LOW COMPLEX 20 MIN: CPT | Mod: GP | Performed by: PHYSICAL THERAPIST

## 2020-11-11 PROCEDURE — 97110 THERAPEUTIC EXERCISES: CPT | Mod: GP | Performed by: PHYSICAL THERAPIST

## 2020-11-11 PROCEDURE — 92507 TX SP LANG VOICE COMM INDIV: CPT | Mod: GN | Performed by: SPEECH-LANGUAGE PATHOLOGIST

## 2020-11-11 NOTE — PROGRESS NOTES
[facility]        OUTPATIENT PHYSICAL THERAPY FUNCTIONAL EVALUATION  PLAN OF TREATMENT FOR OUTPATIENT REHABILITATION  (COMPLETE FOR INITIAL CLAIMS ONLY)  Patient's Last Name, First Name, M.I.  YOB: 1950  Tiara Smith     Provider's Name   [facility]   Medical Record No.  [id number]     Start of Care Date:  11/11/20   Onset Date:  11/03/20   Type:     _X__PT   ____OT  ____SLP Medical Diagnosis:  CVA I63.519     PT Diagnosis:  left CVA right lia 11/3/20  Visits from SOC:  1                              __________________________________________________________________________________  Plan of Treatment/Functional Goals:  ADL retraining, balance training, ROM, strengthening, stretching           GOALS  1  instruction in HEP and compliant with it 5 of 7 days to improve quality of life   02/11/21    2  patient to be able to stand and dress her pants with good safety and strength   02/11/21                                                                      Therapy Frequency:      Predicted Duration of Therapy Intervention:  every other week x 1-3 months     Luci Linares, PT                                    I CERTIFY THE NEED FOR THESE SERVICES FURNISHED UNDER        THIS PLAN OF TREATMENT AND WHILE UNDER MY CARE     (Physician co-signature of this document indicates review and certification of the therapy plan).                Certification Date From:  11/11/20   Certification Date To:  02/11/21    Referring Provider:  mitzy Ash MD    Initial Assessment  See Epic Evaluation- Start of Care Date: 11/11/20

## 2020-11-11 NOTE — PROGRESS NOTES
11/11/20 1335   Quick Adds   Quick Adds Certification   Type of Visit Initial OP PT Evaluation   General Information   Start of Care Date 11/11/20   Referring Physician mitzy Ash MD   Orders Evaluate and Treat as Indicated   Order Date 11/04/20   Medical Diagnosis CVA I63.519   Onset of illness/injury or Date of Surgery 11/03/20   Precautions/Limitations no known precautions/limitations   Surgical/Medical history reviewed Yes   Pertinent history of current problem (include personal factors and/or comorbidities that impact the POC) patient seen due to left CVA on 11/3/20 was in hospital 2 days , lives with  in [facility] and is independent , feels like right leg is alittle weak and right UE feels fine is having alittle more trouble with her swalling and speech and is seeing demetrio in speach and wants to see the OT about her driving . PMH none reported    Prior level of function comment walks daily    Current Community Support Family/friend caregiver   Patient role/Employment history Retired   Patient/Family Goals Statement speech and get right leg stronger and be able to drive    Fall Risk Screen   Fall screen completed by PT   Have you fallen 2 or more times in the past year? No   Have you fallen and had an injury in the past year? No   Is patient a fall risk? No   Cognitive Status Examination   Orientation orientation to person, place and time   Level of Consciousness alert   Follows Commands and Answers Questions 100% of the time   Strength   Strength Comments sit to stand 5x 24 seconds , right UE/LE  grossly 4-/5 left 4/5    Bed Mobility   Bed Mobility Comments independent    Transfer Skills   Transfer Comments independent    Planned Therapy Interventions   Planned Therapy Interventions ADL retraining;balance training;ROM;strengthening;stretching   Clinical Impression   Criteria for Skilled Therapeutic Interventions Met yes, treatment indicated   PT Diagnosis left CVA right lia 11/3/20    Functional  limitations due to impairments has trouble putting her pants on    Clinical Presentation Stable/Uncomplicated   Clinical Presentation Rationale patient seen s/p left CVA 11/3/20 presents with decrease strength in her right LE making it difficult to dress and she fatigues easy . Rx is exercises in clinic and HEP    Clinical Decision Making (Complexity) Low complexity   Predicted Duration of Therapy Intervention (days/wks) every other week x 1-3 months    Risk & Benefits of therapy have been explained Yes   Patient, Family & other staff in agreement with plan of care Yes   Education Assessment   Preferred Learning Style Listening;Reading;Demonstration   Barriers to Learning No barriers   GOALS   PT Eval Goals 1;2   Goal 1   Goal Identifier 1   Goal Description instruction in HEP and compliant with it 5 of 7 days to improve quality of life    Target Date 02/11/21   Goal 2   Goal Identifier 2   Goal Description patient to be able to stand and dress her pants with good safety and strength    Target Date 02/11/21   Total Evaluation Time   PT Eval, Low Complexity Minutes (63551) 15   Therapy Certification   Certification date from 11/11/20   Certification date to 02/11/21   Medical Diagnosis CVA I63.519   Certification I certify the need for these services furnished under this plan of treatment and while under my care.  (Physician co-signature of this document indicates review and certification of the therapy plan).

## 2020-11-17 ENCOUNTER — HOSPITAL ENCOUNTER (OUTPATIENT)
Dept: OCCUPATIONAL THERAPY | Facility: CLINIC | Age: 70
Setting detail: THERAPIES SERIES
End: 2020-11-17
Attending: STUDENT IN AN ORGANIZED HEALTH CARE EDUCATION/TRAINING PROGRAM
Payer: MEDICARE

## 2020-11-17 ENCOUNTER — HOSPITAL ENCOUNTER (OUTPATIENT)
Dept: SPEECH THERAPY | Facility: CLINIC | Age: 70
Setting detail: THERAPIES SERIES
End: 2020-11-17
Attending: STUDENT IN AN ORGANIZED HEALTH CARE EDUCATION/TRAINING PROGRAM
Payer: MEDICARE

## 2020-11-17 PROCEDURE — 92507 TX SP LANG VOICE COMM INDIV: CPT | Mod: GN | Performed by: SPEECH-LANGUAGE PATHOLOGIST

## 2020-11-17 PROCEDURE — 97165 OT EVAL LOW COMPLEX 30 MIN: CPT | Mod: GO
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
## 2020-11-24 ENCOUNTER — HOSPITAL ENCOUNTER (OUTPATIENT)
Dept: OCCUPATIONAL THERAPY | Facility: CLINIC | Age: 70
Setting detail: THERAPIES SERIES
End: 2020-11-24
Attending: STUDENT IN AN ORGANIZED HEALTH CARE EDUCATION/TRAINING PROGRAM
Payer: MEDICARE

## 2020-11-24 ENCOUNTER — HOSPITAL ENCOUNTER (OUTPATIENT)
Dept: SPEECH THERAPY | Facility: CLINIC | Age: 70
Setting detail: THERAPIES SERIES
End: 2020-11-24
Attending: STUDENT IN AN ORGANIZED HEALTH CARE EDUCATION/TRAINING PROGRAM
Payer: MEDICARE

## 2020-11-24 ENCOUNTER — OFFICE VISIT (OUTPATIENT)
Dept: FAMILY MEDICINE | Facility: CLINIC | Age: 70
End: 2020-11-24
Payer: MEDICARE

## 2020-11-24 ENCOUNTER — HOSPITAL ENCOUNTER (OUTPATIENT)
Dept: PHYSICAL THERAPY | Facility: CLINIC | Age: 70
Setting detail: THERAPIES SERIES
End: 2020-11-24
Attending: STUDENT IN AN ORGANIZED HEALTH CARE EDUCATION/TRAINING PROGRAM
Payer: MEDICARE

## 2020-11-24 VITALS
SYSTOLIC BLOOD PRESSURE: 144 MMHG | OXYGEN SATURATION: 100 % | RESPIRATION RATE: 16 BRPM | HEART RATE: 78 BPM | DIASTOLIC BLOOD PRESSURE: 92 MMHG | TEMPERATURE: 97.5 F | WEIGHT: 186 LBS | BODY MASS INDEX: 30.95 KG/M2

## 2020-11-24 DIAGNOSIS — I63.519 CEREBROVASCULAR ACCIDENT (CVA) DUE TO OCCLUSION OF MIDDLE CEREBRAL ARTERY, UNSPECIFIED BLOOD VESSEL LATERALITY (H): ICD-10-CM

## 2020-11-24 DIAGNOSIS — I10 HYPERTENSION GOAL BP (BLOOD PRESSURE) < 140/90: ICD-10-CM

## 2020-11-24 DIAGNOSIS — Z12.11 COLON CANCER SCREENING: ICD-10-CM

## 2020-11-24 DIAGNOSIS — Z78.0 POST-MENOPAUSE: ICD-10-CM

## 2020-11-24 DIAGNOSIS — Z23 NEED FOR VACCINATION: ICD-10-CM

## 2020-11-24 DIAGNOSIS — Z00.00 ENCOUNTER FOR MEDICARE ANNUAL WELLNESS EXAM: Primary | ICD-10-CM

## 2020-11-24 DIAGNOSIS — Z09 HOSPITAL DISCHARGE FOLLOW-UP: ICD-10-CM

## 2020-11-24 DIAGNOSIS — R32 URINARY INCONTINENCE, UNSPECIFIED TYPE: ICD-10-CM

## 2020-11-24 DIAGNOSIS — E78.5 HYPERLIPIDEMIA LDL GOAL <130: ICD-10-CM

## 2020-11-24 PROCEDURE — 97530 THERAPEUTIC ACTIVITIES: CPT | Mod: GO,59

## 2020-11-24 PROCEDURE — 97110 THERAPEUTIC EXERCISES: CPT | Mod: GO,59

## 2020-11-24 PROCEDURE — 99397 PER PM REEVAL EST PAT 65+ YR: CPT | Mod: 25 | Performed by: FAMILY MEDICINE

## 2020-11-24 PROCEDURE — 86803 HEPATITIS C AB TEST: CPT | Performed by: FAMILY MEDICINE

## 2020-11-24 PROCEDURE — 92507 TX SP LANG VOICE COMM INDIV: CPT | Mod: GN | Performed by: SPEECH-LANGUAGE PATHOLOGIST

## 2020-11-24 PROCEDURE — 99214 OFFICE O/P EST MOD 30 MIN: CPT | Mod: 25 | Performed by: FAMILY MEDICINE

## 2020-11-24 PROCEDURE — 90715 TDAP VACCINE 7 YRS/> IM: CPT | Performed by: FAMILY MEDICINE

## 2020-11-24 PROCEDURE — G0009 ADMIN PNEUMOCOCCAL VACCINE: HCPCS | Mod: 59 | Performed by: FAMILY MEDICINE

## 2020-11-24 PROCEDURE — 90471 IMMUNIZATION ADMIN: CPT | Performed by: FAMILY MEDICINE

## 2020-11-24 PROCEDURE — 97110 THERAPEUTIC EXERCISES: CPT | Mod: GP,59 | Performed by: PHYSICAL THERAPIST

## 2020-11-24 PROCEDURE — 36415 COLL VENOUS BLD VENIPUNCTURE: CPT | Performed by: FAMILY MEDICINE

## 2020-11-24 PROCEDURE — 90732 PPSV23 VACC 2 YRS+ SUBQ/IM: CPT | Performed by: FAMILY MEDICINE

## 2020-11-24 RX ORDER — LISINOPRIL 10 MG/1
10 TABLET ORAL DAILY
Qty: 30 TABLET | Refills: 1 | Status: SHIPPED | OUTPATIENT
Start: 2020-11-24 | End: 2020-12-07

## 2020-11-24 ASSESSMENT — PAIN SCALES - GENERAL: PAINLEVEL: NO PAIN (0)

## 2020-11-24 ASSESSMENT — ACTIVITIES OF DAILY LIVING (ADL): CURRENT_FUNCTION: NO ASSISTANCE NEEDED

## 2020-11-24 NOTE — NURSING NOTE
Prior to immunization administration, verified patients identity using patient s name and date of birth. Please see Immunization Activity for additional information.     Screening Questionnaire for Adult Immunization    Are you sick today?   No   Do you have allergies to medications, food, a vaccine component or latex?   No   Have you ever had a serious reaction after receiving a vaccination?   No   Do you have a long-term health problem with heart, lung, kidney, or metabolic disease (e.g., diabetes), asthma, a blood disorder, no spleen, complement component deficiency, a cochlear implant, or a spinal fluid leak?  Are you on long-term aspirin therapy?   No   Do you have cancer, leukemia, HIV/AIDS, or any other immune system problem?   No   Do you have a parent, brother, or sister with an immune system problem?   No   In the past 3 months, have you taken medications that affect  your immune system, such as prednisone, other steroids, or anticancer drugs; drugs for the treatment of rheumatoid arthritis, Crohn s disease, or psoriasis; or have you had radiation treatments?   No   Have you had a seizure, or a brain or other nervous system problem?   No   During the past year, have you received a transfusion of blood or blood    products, or been given immune (gamma) globulin or antiviral drug?   No   For women: Are you pregnant or is there a chance you could become       pregnant during the next month?   No   Have you received any vaccinations in the past 4 weeks?   No     Immunization questionnaire answers were all negative.      Patient instructed to remain in clinic for 15 minutes afterwards, and to report any adverse reaction to me immediately.       Screening performed by Elin Eason on 11/24/2020 at 11:22 AM.

## 2020-11-24 NOTE — PROGRESS NOTES
"Subjective     Tiara Smith is a 69 year old female who presents to clinic today for the following health issues:    HPI           Hospital Follow-up Visit:    Hospital/Nursing Home/IP Rehab Facility: [facility]  Date of Admission: 11/3/2020  Date of Discharge: 11/4/2020  Reason(s) for Admission: Stroke      Was your hospitalization related to COVID-19? No   Problems taking medications regularly:  None  Medication changes since discharge: None  Problems adhering to non-medication therapy:  None    Summary of hospitalization:  [facility] discharge summary reviewed  Diagnostic Tests/Treatments reviewed.  Follow up needed: none  Other Healthcare Providers Involved in Patient s Care:         None  Update since discharge: improved. {TIP  Include information from family/caregivers, SNF, Care Coordination :044537}      Post Discharge Medication Reconciliation: {ACO Med Rec (Provider):651194}.  Plan of care communicated with {Communicate Plan to:807219::\"patient\"}     {Reference  Coding guidelines- Moderate Complexity F2F/Video within 7 - 14 days of discharge 63083, High Complexity F2F/Video within 7 days 64306 or odyeyk63 days 41156 :540715}         {additonal problems for provider to add (Optional):289894}    Review of Systems   {ROS COMP (Optional):623635}      Objective    BP (!) 162/96   Pulse 78   Temp 97.5  F (36.4  C) (Temporal)   Resp 16   Wt 84.4 kg (186 lb)   SpO2 100%   BMI 30.95 kg/m    Body mass index is 30.95 kg/m .  Physical Exam   {Exam List (Optional):981313}    {Diagnostic Test Results (Optional):734976}        {PROVIDER CHARTING PREFERENCE:592085}      "

## 2020-11-24 NOTE — PROGRESS NOTES
The patient was provided with suggestions to help her develop a healthy physical lifestyle.  The patient was counseled and encouraged to consider modifying their diet and eating habits. She was provided with information on recommended healthy diet options.  Information on urinary incontinence and treatment options given to patient.  The patient was provided with suggestions to help her develop a healthy emotional lifestyle.

## 2020-11-24 NOTE — PATIENT INSTRUCTIONS
Patient Education   Personalized Prevention Plan  You are due for the preventive services outlined below.  Your care team is available to assist you in scheduling these services.  If you have already completed any of these items, please share that information with your care team to update in your medical record.  Health Maintenance Due   Topic Date Due     Osteoporosis Screening  1950     Hepatitis C Screening  12/04/1968     Diptheria Tetanus Pertussis (DTAP/TDAP/TD) Vaccine (1 - Tdap) 12/04/1975     Zoster (Shingles) Vaccine (1 of 2) 12/04/2000     Mammogram  03/17/2007     Colorectal Cancer Screening  03/29/2015     FALL RISK ASSESSMENT  12/04/2015     Pneumococcal Vaccine (1 of 1 - PPSV23) 12/04/2015     Flu Vaccine (1) 09/01/2020     Your Health Risk Assessment indicates you feel you are not in good health    A healthy lifestyle helps keep the body fit and the mind alert. It helps protect you from disease, helps you fight disease, and helps prevent chronic disease (disease that doesn't go away) from getting worse. This is important as you get older and begin to notice twinges in muscles and joints and a decline in the strength and stamina you once took for granted. A healthy lifestyle includes good healthcare, good nutrition, weight control, recreation, and regular exercise. Avoid harmful substances and do what you can to keep safe. Another part of a healthy lifestyle is stay mentally active and socially involved.    Good healthcare     Have a wellness visit every year.     If you have new symptoms, let us know right away. Don't wait until the next checkup.     Take medicines exactly as prescribed and keep your medicines in a safe place. Tell us if your medicine causes problems.   Healthy diet and weight control     Eat 3 or 4 small, nutritious, low-fat, high-fiber meals a day. Include a variety of fruits, vegetables, and whole-grain foods.     Make sure you get enough calcium in your diet. Calcium,  vitamin D, and exercise help prevent osteoporosis (bone thinning).     If you live alone, try eating with others when you can. That way you get a good meal and have company while you eat it.     Try to keep a healthy weight. If you eat more calories than your body uses for energy, it will be stored as fat and you will gain weight.     Recreation   Recreation is not limited to sports and team events. It includes any activity that provides relaxation, interest, enjoyment, and exercise. Recreation provides an outlet for physical, mental, and social energy. It can give a sense of worth and achievement. It can help you stay healthy.    Mental Exercise and Social Involvement  Mental and emotional health is as important as physical health. Keep in touch with friends and family. Stay as active as possible. Continue to learn and challenge yourself.   Things you can do to stay mentally active are:    Learn something new, like a foreign language or musical instrument.     Play SCRABBLE or do crossword puzzles. If you cannot find people to play these games with you at home, you can play them with others on your computer through the Internet.     Join a games club--anything from card games to chess or checkers or lawn bowling.     Start a new hobby.     Go back to school.     Volunteer.     Read.   Keep up with world events.    Understanding USDA MyPlate  The USDA (U.S. Department of Agriculture) has guidelines to help you make healthy food choices. These are called MyPlate. MyPlate shows the food groups that make up healthy meals using the image of a place setting. Before you eat, think about the healthiest choices for what to put onto your plate or into your cup or bowl. To learn more about building a healthy plate, visit www.choosemyplate.gov.    The food groups    Fruits. Any fruit or 100% fruit juice counts as part of the Fruit Group. Fruits may be fresh, canned, frozen, or dried, and may be whole, cut-up, or pureed. Make  half your plate fruits and vegetables.    Vegetables. Any vegetable or 100% vegetable juice counts as a member of the Vegetable Group. Vegetables may be fresh, frozen, canned, or dried. They can be served raw or cooked and may be whole, cut-up, or mashed. Make half your plate fruits and vegetables.    Grains. All foods made from grains are part of the Grains Group. These include wheat, rice, oats, cornmeal, and barley such as bread, pasta, oatmeal, cereal, tortillas, and grits. Grains should be no more than a quarter of your plate. At least half of your grains should be whole grains.    Protein. This group includes meat, poultry, seafood, beans and peas, eggs, processed soy products (like tofu), nuts (including nut butters), and seeds. Make protein choices no more than a quarter of your plate. Meat and poultry choices should be lean or low fat.    Dairy. All fluid milk products and foods made from milk that contain calcium, like yogurt and cheese, are part of the Dairy Group. (Foods that have little calcium, such as cream, butter, and cream cheese, are not part of the group.) Most dairy choices should be low-fat or fat-free.    Oils. These are fats that are liquid at room temperature. They include canola, corn, olive, soybean, and sunflower oil. Foods that are mainly oil include mayonnaise, certain salad dressings, and soft margarines. You should have only 5 to 7 teaspoons of oils a day. You probably already get this much from the food you eat.  Contractually last reviewed this educational content on 8/1/2017 2000-2020 [employer]. [address] [place] [address]. All rights reserved. This information is not intended as a substitute for professional medical care. Always follow your healthcare professional's instructions.          Urinary Incontinence, Female (Adult)   Urinary incontinence means loss of bladder control. This problem affects many women, especially as they get older. If you have  incontinence, you may be embarrassed to ask for help. But know that this problem can be treated.   Types of Incontinence  There are different types of incontinence. Two of the main types are described here. You can have more than one type.     Stress incontinence. With this type, urine leaks when pressure (stress) is put on the bladder. This may happen when you cough, sneeze, or laugh. Stress incontinence most often occurs because the pelvic floor muscles that support the bladder and urethra are weak. This can happen after pregnancy and vaginal childbirth or a hysterectomy. It can also be due to excess body weight or hormone changes.    Urge incontinence (also called overactive bladder). With this type, a sudden urge to urinate is felt often. This may happen even though there may not be much urine in the bladder. The need to urinate often during the night is common. Urge incontinence most often occurs because of bladder spasms. This may be due to bladder irritation or infection. Damage to bladder nerves or pelvic muscles, constipation, and certain medicines can also lead to urge incontinence.  Treatment depends on the cause. Further evaluation is needed to find the type you have. This will likely include an exam and certain tests. Based on the results, you and your healthcare provider can then plan treatment. Until a diagnosis is made, the home care tips below can help ease symptoms.   Home care    Do pelvic floor muscle exercises, if they are prescribed. The pelvic floor muscles help support the bladder and urethra. Many women find that their symptoms improve when doing special exercises that strengthen these muscles. To do the exercises, contract the muscles you would use to stop your stream of urine. But do this when you re not urinating. Hold for 10 seconds, then relax. Repeat 10 to 20 times in a row, at least 3 times a day. Your healthcare provider may give you other instructions for how to do the exercises and  how often.    Keep a bladder diary. This helps track how often and how much you urinate over a set period of time. Bring this diary with you to your next visit with the provider. The information can help your provider learn more about your bladder problem.    Lose weight, if advised to by your provider. Extra weight puts pressure on the bladder. Your provider can help you create a weight-loss plan that s right for you. This may include exercising more and making certain diet changes.    Don't have foods and drinks that may irritate the bladder. These can include alcohol and caffeinated drinks.    Quit smoking. Smoking and other tobacco use can lead to a long-term (chronic) cough that strains the pelvic floor muscles. Smoking may also damage the bladder and urethra. Talk with your provider about treatments or methods you can use to quit smoking.    If drinking large amounts of fluid makes you have symptoms, you may be advised to limit your fluid intake. You may also be advised to drink most of your fluids during the day and to limit fluids at night.    If you re worried about urine leakage or accidents, you may wear absorbent pads to catch urine. Change the pads often. This helps reduce discomfort. It may also reduce the risk of skin or bladder infections.    Follow-up care  Follow up with your healthcare provider, or as directed. It may take some to find the right treatment for your problem. But healthy lifestyle changes can be made right away. These include such things as exercising on a regular basis, eating a healthy diet, losing weight (if needed), and quitting smoking. Your treatment plan may include special therapies or medicines. Certain procedures or surgery may also be options. Talk about any questions you have with your provider.   When to seek medical advice  Call the healthcare provider right away if any of these occur:    Fever of 100.4 F (38 C) or higher, or as directed by your provider    Bladder pain  or fullness    Belly swelling    Nausea or vomiting    Back pain    Weakness, dizziness, or fainting  [employer]oven last reviewed this educational content on 1/1/2020 2000-2020 [employer] [address] [facility], [place] [address]. All rights reserved. This information is not intended as a substitute for professional medical care. Always follow your healthcare professional's instructions.        Your Health Risk Assessment indicates you feel you are not in good emotional health.    Recreation   Recreation is not limited to sports and team events. It includes any activity that provides relaxation, interest, enjoyment, and exercise. Recreation provides an outlet for physical, mental, and social energy. It can give a sense of worth and achievement. It can help you stay healthy.    Mental Exercise and Social Involvement  Mental and emotional health is as important as physical health. Keep in touch with friends and family. Stay as active as possible. Continue to learn and challenge yourself.   Things you can do to stay mentally active are:    Learn something new, like a foreign language or musical instrument.     Play SCRABBLE or do crossword puzzles. If you cannot find people to play these games with you at home, you can play them with others on your computer through the Internet.     Join a games club--anything from card games to chess or checkers or lawn bowling.     Start a new hobby.     Go back to school.     Volunteer.     Read.   Keep up with world events.

## 2020-11-24 NOTE — LETTER
November 30, 2020      Tiara Aguilaon  [address] [facility] [address]        Dear MsJuniorLuis,    We are writing to inform you of your test results.    screening test for hepatitis C was negative/normal.       Deo Arevalo MD.     Resulted Orders   Hepatitis C antibody   Result Value Ref Range    Hepatitis C Antibody Nonreactive NR^Nonreactive      Comment:      Assay performance characteristics have not been established for newborns,   infants, and children         If you have any questions or concerns, please call the clinic at the number listed above.
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
## 2020-11-25 LAB — HCV AB SERPL QL IA: NONREACTIVE

## 2020-12-01 ENCOUNTER — HOSPITAL ENCOUNTER (OUTPATIENT)
Dept: OCCUPATIONAL THERAPY | Facility: CLINIC | Age: 70
Setting detail: THERAPIES SERIES
End: 2020-12-01
Attending: STUDENT IN AN ORGANIZED HEALTH CARE EDUCATION/TRAINING PROGRAM
Payer: MEDICARE

## 2020-12-01 PROCEDURE — 97530 THERAPEUTIC ACTIVITIES: CPT | Mod: GO

## 2020-12-01 PROCEDURE — 97112 NEUROMUSCULAR REEDUCATION: CPT | Mod: GO

## 2020-12-02 ENCOUNTER — TELEPHONE (OUTPATIENT)
Dept: NEUROLOGY | Facility: CLINIC | Age: 70
End: 2020-12-02

## 2020-12-03 ENCOUNTER — TELEPHONE (OUTPATIENT)
Dept: NEUROLOGY | Facility: CLINIC | Age: 70
End: 2020-12-03

## 2020-12-03 NOTE — TELEPHONE ENCOUNTER
[facility] Call Center    Phone Message    May a detailed message be left on voicemail: yes     Reason for Call: Pt's  returned call to reschedule Pt's appointment to virtual.  He is unwilling to do a virtual visit.  He said that he is bringing her in for an in person visit.  Please call him back to discuss.  Thanks.    Action Taken: Message routed to:  Adult Clinics: Neurology p [phone]    Travel Screening: Not Applicable

## 2020-12-03 NOTE — TELEPHONE ENCOUNTER
I called and spoke to patient and explained that Dr. Guan will not be in clinic and she should not come in to clinic.  Pt requested that I call her daughter Krysta to see if we can set up Video appt -I called daughter and there was no answer or Voicemail.  I called patient back and spoke with  he is going to have Krysta (daughter) call us back to see if we can set up video on her phone or if she has a laptop.    Beulah Oreilly LPN

## 2020-12-07 ENCOUNTER — HOSPITAL ENCOUNTER (OUTPATIENT)
Dept: OCCUPATIONAL THERAPY | Facility: CLINIC | Age: 70
Setting detail: THERAPIES SERIES
End: 2020-12-07
Attending: STUDENT IN AN ORGANIZED HEALTH CARE EDUCATION/TRAINING PROGRAM
Payer: MEDICARE

## 2020-12-07 ENCOUNTER — TELEPHONE (OUTPATIENT)
Dept: FAMILY MEDICINE | Facility: CLINIC | Age: 70
End: 2020-12-07

## 2020-12-07 ENCOUNTER — ALLIED HEALTH/NURSE VISIT (OUTPATIENT)
Dept: FAMILY MEDICINE | Facility: CLINIC | Age: 70
End: 2020-12-07
Payer: MEDICARE

## 2020-12-07 VITALS — SYSTOLIC BLOOD PRESSURE: 144 MMHG | DIASTOLIC BLOOD PRESSURE: 98 MMHG | HEART RATE: 74 BPM

## 2020-12-07 DIAGNOSIS — I10 HYPERTENSION GOAL BP (BLOOD PRESSURE) < 140/90: Primary | ICD-10-CM

## 2020-12-07 DIAGNOSIS — I63.519 CEREBROVASCULAR ACCIDENT (CVA) DUE TO OCCLUSION OF MIDDLE CEREBRAL ARTERY, UNSPECIFIED BLOOD VESSEL LATERALITY (H): ICD-10-CM

## 2020-12-07 PROCEDURE — 99207 PR DROP WITH A PROCEDURE: CPT

## 2020-12-07 PROCEDURE — 97112 NEUROMUSCULAR REEDUCATION: CPT | Mod: GO | Performed by: OCCUPATIONAL THERAPIST

## 2020-12-07 RX ORDER — ATORVASTATIN CALCIUM 40 MG/1
40 TABLET, FILM COATED ORAL EVERY EVENING
Qty: 30 TABLET | Refills: 1 | Status: SHIPPED | OUTPATIENT
Start: 2020-12-07 | End: 2021-03-01

## 2020-12-07 RX ORDER — LISINOPRIL 10 MG/1
10 TABLET ORAL DAILY
Qty: 30 TABLET | Refills: 1 | Status: CANCELLED | OUTPATIENT
Start: 2020-12-07

## 2020-12-07 RX ORDER — CLOPIDOGREL BISULFATE 75 MG/1
75 TABLET ORAL DAILY
Qty: 30 TABLET | Refills: 1 | Status: SHIPPED | OUTPATIENT
Start: 2020-12-07 | End: 2020-12-18

## 2020-12-07 RX ORDER — ASPIRIN 81 MG/1
81 TABLET, CHEWABLE ORAL DAILY
Qty: 30 TABLET | Refills: 1 | Status: SHIPPED | OUTPATIENT
Start: 2020-12-07

## 2020-12-07 NOTE — TELEPHONE ENCOUNTER
Our goal is to have forms completed with 72 hours, however some forms may require a visit or additional information.    Who is the form from?: DMV (if other please explain)  Where the form came from: Patient or family brought in     What clinic location was the form placed at?: [place]  Where the form was placed: Given to physician  What number is listed as a contact on the form?: [phone]    Phone call message- patient request for a letter, form or note:    Date needed: as soon as possible  Please fax to [phone]  Has the patient signed a consent form for release of information? Not Applicable    Additional comments:     Call taken on 12/7/2020 at 11:10 AM by Maci tSringer CMA    Type of letter, form or note: DMV
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
## 2020-12-07 NOTE — TELEPHONE ENCOUNTER
LMTC: Please see message below.  Maci Stringer CMA ([facility])      Form is needing a visit to have this completed. Form is asking for verification that patient is capable of driving a vehicle.   Maci Stringer CMA ([facility])

## 2020-12-07 NOTE — TELEPHONE ENCOUNTER
Requested Prescriptions   Pending Prescriptions Disp Refills     aspirin (ASA) 81 MG chewable tablet 30 tablet 1     Sig: Take 1 tablet (81 mg) by mouth daily     Last Written Prescription Date:  11/5/2020  Last Fill Quantity: 30,  # refills: 1   Last office visit: 11/24/2020 with prescribing provider:     Future Office Visit:          There is no refill protocol information for this order        lisinopril (ZESTRIL) 10 MG tablet 30 tablet 1     Sig: Take 1 tablet (10 mg) by mouth daily       Last Written Prescription Date:  11/24/2020  Last Fill Quantity: 30,  # refills: 1   Last office visit: 11/24/2020 with prescribing provider:     Future Office Visit:          There is no refill protocol information for this order        clopidogrel (PLAVIX) 75 MG tablet 30 tablet 1     Sig: Take 1 tablet (75 mg) by mouth daily       Last Written Prescription Date:  11/5/2020  Last Fill Quantity: 30,  # refills: 1   Last office visit: 11/24/2020 with prescribing provider:     Future Office Visit:          There is no refill protocol information for this order        atorvastatin (LIPITOR) 40 MG tablet 30 tablet 1     Sig: Take 1 tablet (40 mg) by mouth every evening       There is no refill protocol information for this order        Last Written Prescription Date:  11/5/2020  Last Fill Quantity: 30,  # refills: 1   Last office visit: 11/24/2020 with prescribing provider:     Future Office Visit:

## 2020-12-07 NOTE — NURSING NOTE
Tiara Smith is a 70 year old patient who comes in today for a Blood Pressure check.  Initial BP:  BP (!) 144/98   Pulse 74      74  Disposition: provider notified while patient was in the clinic. Dr. Arevalo increased her Lisinopril to 20 mg now and have her recheck on Friday 12/11 at 10:20 am.    Patient was notified   Wrote down all the information for patient and also changed her refill request to the increased dose now.  ALTHEA/MA

## 2020-12-08 ENCOUNTER — VIRTUAL VISIT (OUTPATIENT)
Dept: NEUROLOGY | Facility: CLINIC | Age: 70
End: 2020-12-08
Payer: MEDICARE

## 2020-12-08 DIAGNOSIS — I63.519 CEREBROVASCULAR ACCIDENT (CVA) DUE TO OCCLUSION OF MIDDLE CEREBRAL ARTERY, UNSPECIFIED BLOOD VESSEL LATERALITY (H): Primary | ICD-10-CM

## 2020-12-08 PROCEDURE — 99203 OFFICE O/P NEW LOW 30 MIN: CPT | Mod: 95 | Performed by: INTERNAL MEDICINE

## 2020-12-08 NOTE — PROGRESS NOTES
"Tiara Smith is a 70 year old female who is being evaluated via a billable video visit.      The patient has been notified of following:     \"This video visit will be conducted via a call between you and your physician/provider. We have found that certain health care needs can be provided without the need for an in-person physical exam.  This service lets us provide the care you need with a video conversation.  If a prescription is necessary we can send it directly to your pharmacy.  If lab work is needed we can place an order for that and you can then stop by our lab to have the test done at a later time.    Video visits are billed at different rates depending on your insurance coverage.  Please reach out to your insurance provider with any questions.    If during the course of the call the physician/provider feels a video visit is not appropriate, you will not be charged for this service.\"    Patient has given verbal consent for Video visit? Yes  How would you like to obtain your AVS? Mail a copy  If you are dropped from the video visit, the video invite should be resent to: Text to cell phone: [phone]  Will anyone else be joining your video visit? No                  "
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
## 2020-12-08 NOTE — TELEPHONE ENCOUNTER
ASA, Plavix, and Lipitor  Prescription approved per [facility] Refill Protocol.    Heidi Foster RN

## 2020-12-08 NOTE — TELEPHONE ENCOUNTER
Routing to covering provider as PCP is currently out of office.     Lisinopril  Routing refill request to provider for review/approval because:  Two doses on medication list.   Elevated blood pressures above goal.     Heidi Foster RN

## 2020-12-09 ENCOUNTER — HOSPITAL ENCOUNTER (OUTPATIENT)
Dept: SPEECH THERAPY | Facility: CLINIC | Age: 70
Setting detail: THERAPIES SERIES
End: 2020-12-09
Attending: STUDENT IN AN ORGANIZED HEALTH CARE EDUCATION/TRAINING PROGRAM
Payer: MEDICARE

## 2020-12-09 ENCOUNTER — HOSPITAL ENCOUNTER (OUTPATIENT)
Dept: PHYSICAL THERAPY | Facility: CLINIC | Age: 70
Setting detail: THERAPIES SERIES
End: 2020-12-09
Attending: STUDENT IN AN ORGANIZED HEALTH CARE EDUCATION/TRAINING PROGRAM
Payer: MEDICARE

## 2020-12-09 PROCEDURE — 97110 THERAPEUTIC EXERCISES: CPT | Mod: GP,59 | Performed by: PHYSICAL THERAPIST

## 2020-12-09 PROCEDURE — 92507 TX SP LANG VOICE COMM INDIV: CPT | Mod: GN | Performed by: SPEECH-LANGUAGE PATHOLOGIST

## 2020-12-09 RX ORDER — LISINOPRIL 10 MG/1
20 TABLET ORAL DAILY
Qty: 30 TABLET | Refills: 1 | COMMUNITY
Start: 2020-12-07 | End: 2020-12-18

## 2020-12-09 RX ORDER — LISINOPRIL 20 MG/1
20 TABLET ORAL DAILY
Qty: 30 TABLET | Refills: 1 | Status: SHIPPED | OUTPATIENT
Start: 2020-12-09 | End: 2020-12-18

## 2020-12-11 ENCOUNTER — ALLIED HEALTH/NURSE VISIT (OUTPATIENT)
Dept: FAMILY MEDICINE | Facility: CLINIC | Age: 70
End: 2020-12-11
Payer: MEDICARE

## 2020-12-11 ENCOUNTER — TELEPHONE (OUTPATIENT)
Dept: FAMILY MEDICINE | Facility: CLINIC | Age: 70
End: 2020-12-11

## 2020-12-11 VITALS — SYSTOLIC BLOOD PRESSURE: 142 MMHG | HEART RATE: 62 BPM | DIASTOLIC BLOOD PRESSURE: 100 MMHG

## 2020-12-11 DIAGNOSIS — I10 HYPERTENSION GOAL BP (BLOOD PRESSURE) < 140/90: Primary | ICD-10-CM

## 2020-12-11 DIAGNOSIS — I10 ESSENTIAL HYPERTENSION: Primary | ICD-10-CM

## 2020-12-11 PROCEDURE — 99207 PR NO CHARGE NURSE ONLY: CPT

## 2020-12-11 RX ORDER — LISINOPRIL AND HYDROCHLOROTHIAZIDE 12.5; 2 MG/1; MG/1
1 TABLET ORAL DAILY
Qty: 30 TABLET | Status: CANCELLED | OUTPATIENT
Start: 2020-12-11

## 2020-12-11 RX ORDER — LISINOPRIL AND HYDROCHLOROTHIAZIDE 12.5; 2 MG/1; MG/1
1 TABLET ORAL DAILY
Qty: 30 TABLET | Refills: 0 | Status: SHIPPED | OUTPATIENT
Start: 2020-12-11 | End: 2021-01-04

## 2020-12-11 NOTE — TELEPHONE ENCOUNTER
Sending to Dr. Arevalo with pended rx. Could not pend and send with float visit. Patient scheduled 12/18/2020 at 11:00am for follow up. Patient instructed to stop taking Lisinopril.     Lucy Tavarez MA 12/11/2020  11:43 AM

## 2020-12-11 NOTE — NURSING NOTE
Creating a telephone encounter and routing rx to Dr. Arevalo.     Lucy Tavarez MA 12/11/2020  11:03 AM

## 2020-12-11 NOTE — TELEPHONE ENCOUNTER
Spoke to patient informed of message below. No further questions at this time.  Maci Stringer CMA ([facility])

## 2020-12-11 NOTE — NURSING NOTE
Tiara Smith is a 70 year old patient who comes in today for a Blood Pressure check.  Initial BP:  BP (!) 142/100 (BP Location: Left arm, Patient Position: Chair, Cuff Size: Adult Regular)   Pulse 62      62  Disposition: follow-up as previously indicated by provider, provider notified while patient in the clinic and Spoke to Dr. Arevalo. Patient will be started on Lisiopril/ hydrochlorothiazide 20/12.5mg 1 tab daily. Patient instructed to stop taking Lisinopril. Patient scheduled bp recheck on 12/18/2020 at 11:00am. Routing to Dr. Arevalo to fill rx.     Lucy Tavarez MA 12/11/2020  10:59 AM

## 2020-12-14 ENCOUNTER — HOSPITAL ENCOUNTER (OUTPATIENT)
Dept: OCCUPATIONAL THERAPY | Facility: CLINIC | Age: 70
Setting detail: THERAPIES SERIES
End: 2020-12-14
Attending: STUDENT IN AN ORGANIZED HEALTH CARE EDUCATION/TRAINING PROGRAM
Payer: MEDICARE

## 2020-12-14 PROCEDURE — 97112 NEUROMUSCULAR REEDUCATION: CPT | Mod: GO

## 2020-12-15 ENCOUNTER — HOSPITAL ENCOUNTER (OUTPATIENT)
Dept: SPEECH THERAPY | Facility: CLINIC | Age: 70
Setting detail: THERAPIES SERIES
End: 2020-12-15
Attending: STUDENT IN AN ORGANIZED HEALTH CARE EDUCATION/TRAINING PROGRAM
Payer: MEDICARE

## 2020-12-15 PROCEDURE — 92507 TX SP LANG VOICE COMM INDIV: CPT | Mod: GN | Performed by: SPEECH-LANGUAGE PATHOLOGIST

## 2020-12-16 NOTE — PROGRESS NOTES
"Outpatient Speech Language Pathology Progress Note     Patient: Tiara Smith  : 1950    Beginning/End Dates of Reporting Period:  11/10/2020 to 2020    Referring Provider: Yan Ash MD    Therapy Diagnosis: Mild expressive aphasia    Client Self Report: Patient arrived on time to session. When asked how things are going, she reported that \"everything is good, word finding is good.\" However, when asked further questions, she reports that she feels her word finding still has a ways to go.    Objective Measurements: Please see below.      Goals:  Goal Identifier Word finding   Goal Description Patient will complete moderate level word finding task using compensatory word finding strategies with up to 90% accuracy given minimal cues.   Target Date 20- extend to 21   Date Met      Progress: Progressing. Patient completing word finding tasks with up to 80% accuracy when provided with occasional semantic and/or phonemic cues. She benefits from having questions reworded and encouragement to focus on getting her overall message across as opposed to specific words. Continue goal.       Progress Toward Goals:   Progress this reporting period: Patient continues to benefit from skilled intervention targeting word finding abilities in order to increase ability to functionally communicate across a variety of settings and communication partners.       Plan:  Continue therapy per current plan of care.    Discharge:  No  "

## 2020-12-16 NOTE — PROGRESS NOTES
[facility]      OUTPATIENT SPEECH LANGUAGE PATHOLOGY  PLAN OF TREATMENT FOR OUTPATIENT REHABILITATION    Patient's Last Name, First Name, M.I.                YOB: 1950  Tiara Smith                        Provider's Name  [facility] Medical Record No.  [id number]                               Onset Date: 11/03/20   Start of Care Date: 11/10/20   Type:     ___PT   ___OT   _X_SLP Medical Diagnosis: Cerebrovascular accident (CVA) due to occlusion of middle cerebral artery, unspecified blood vessel laterality (H) I63.519                       SLP Diagnosis: Mild expressive aphasia      _________________________________________________________________________________  Plan of Treatment:    Frequency/Duration: 1x/week for 6 months     Goals:  Goal Identifier Word finding   Goal Description Patient will complete moderate level word finding task using compensatory word finding strategies with up to 90% accuracy given minimal cues.   Target Date 12/24/20- extend to 1/28/21   Date Met      Progress: Progressing. Patient completing word finding tasks with up to 80% accuracy when provided with occasional semantic and/or phonemic cues. She benefits from having questions reworded and encouragement to focus on getting her overall message across as opposed to specific words. Continue goal.         Progress Toward Goals:   Progress this reporting period: Patient continues to benefit from skilled intervention targeting word finding abilities in order to increase ability to functionally communicate across a variety of settings and communication partners.     Certification date from 12/25/20 to 1/28/21.    Megan Fowler, SLP          I CERTIFY THE NEED FOR THESE SERVICES FURNISHED UNDER        THIS PLAN OF TREATMENT AND WHILE UNDER MY CARE     (Physician co-signature of this document  indicates review and certification of the therapy plan).                Referring Provider: Yan Ash MD

## 2020-12-18 ENCOUNTER — OFFICE VISIT (OUTPATIENT)
Dept: FAMILY MEDICINE | Facility: CLINIC | Age: 70
End: 2020-12-18
Payer: MEDICARE

## 2020-12-18 VITALS
TEMPERATURE: 97.4 F | WEIGHT: 183.4 LBS | HEART RATE: 101 BPM | RESPIRATION RATE: 16 BRPM | BODY MASS INDEX: 30.52 KG/M2 | OXYGEN SATURATION: 97 % | SYSTOLIC BLOOD PRESSURE: 128 MMHG | DIASTOLIC BLOOD PRESSURE: 82 MMHG

## 2020-12-18 DIAGNOSIS — I10 HTN, GOAL BELOW 130/80: Primary | ICD-10-CM

## 2020-12-18 DIAGNOSIS — Z28.21 REFUSED INFLUENZA VACCINE: ICD-10-CM

## 2020-12-18 DIAGNOSIS — I63.519 CEREBROVASCULAR ACCIDENT (CVA) DUE TO OCCLUSION OF MIDDLE CEREBRAL ARTERY, UNSPECIFIED BLOOD VESSEL LATERALITY (H): ICD-10-CM

## 2020-12-18 PROCEDURE — 99213 OFFICE O/P EST LOW 20 MIN: CPT | Performed by: FAMILY MEDICINE

## 2020-12-18 NOTE — PROGRESS NOTES
Subjective     Tiara Smith is a 70 year old female who presents to clinic today for the following health issues:    HPI         Hypertension Follow-up      Do you check your blood pressure regularly outside of the clinic? No     Are you following a low salt diet? Yes    Are your blood pressures ever more than 140 on the top number (systolic) OR more   than 90 on the bottom number (diastolic), for example 140/90? Yes      How many servings of fruits and vegetables do you eat daily?  2-3    On average, how many sweetened beverages do you drink each day (Examples: soda, juice, sweet tea, etc.  Do NOT count diet or artificially sweetened beverages)?   1    How many days per week do you exercise enough to make your heart beat faster? 3 or less    How many minutes a day do you exercise enough to make your heart beat faster? 9 or less    How many days per week do you miss taking your medication? 0    Tiara is here today for follow-up on her high blood pressure and stroke.  She was recently diagnosed with high blood pressure and was treated with lisinopril initially which has been tapering up slowly.  Her blood pressure remained to be elevated since the last visit despite of changing the lisinopril to Zestoretic.  Stated that she has been taking the medication as prescribed with no side effect.  Not checking her blood pressure outside.  No headache or dizziness.  No acute change in her vision.  No chest pain, shortness of breath, leg swelling, orthopnea or dyspnea.  Denied of excessive salt/caffeine intake.    She was also recently diagnosed with a stroke.  She saw a neurologist recently and currently on low dose aspirin; no longer on the Plavix.  She is also on Lipitor and was recommended to be on it for life.  She has been seeing occupational, physical and speech therapists; done with the physical therapy.  Stated overall she is much better, getting stronger each day.  Her gait is more stable and speech is  getting better each day.  No trouble with swallowing.  No focal weakness.  Stated her  license was provoked recently because the paper was not sent in on time and she needs it done as soon as possible.  She has not been driving since the stroke.  No other concern.    Review of Systems   Constitutional, HEENT, cardiovascular, pulmonary, gi and gu systems are negative, except as otherwise noted.      Objective    /82   Pulse 101   Temp 97.4  F (36.3  C) (Temporal)   Resp 16   Wt 83.2 kg (183 lb 6.4 oz)   SpO2 97%   BMI 30.52 kg/m    Body mass index is 30.52 kg/m .  Physical Exam   GENERAL: healthy, alert and no distress  RESP: lungs clear to auscultation - no rales, rhonchi or wheezes  CV: regular rate and rhythm, no murmur  MS: no gross musculoskeletal defects noted, no edema.  Walk without limping.  No focal weakness.  NEURO: Normal strength and tone, mentation intact and speech normal.  Speech still slightly slurly but easily comprehended.  No focal neurological deficit.    No results found for any visits on 12/18/20.        Assessment & Plan     HTN, goal below 130/80  BP is normal and stable.  The goal for her blood pressure is less than 130/80.  Continue with the current doses of Zestoretic at, been tolerating it well.  Emphasized on healthy/low salt diet, daily excercise and weight loss.  Avoid high sugar/caffeine intake. Lab as ordered.  Follow up in 6 month, earlier as needed.    Cerebrovascular accident (CVA) due to occlusion of middle cerebral artery, unspecified blood vessel laterality (H)  Stable and is doing well.  Continue with occupational and speech therapies.  Also encouraged to follow-up with the neurologist as per his/her recommendation.  Will continue with aspirin and Lipitor/statin for life.  She does not smoke.  I encouraged her to get in touch with her neurologist in regard to her  license issue.  Symptoms that need to be seen or call in discussed.    Refused influenza  vaccine  Recommend shingles and flu vaccination but she declined.  Risk and benefit discussed and she is willing to take the risk.    She was also recommended for breast cancer screening with mammogram and she declined.  She has not had one done for years.    Return in about 6 months (around 6/18/2021) for folow up HTN and CVA.    Deo Love Mai, MD  [facility]

## 2020-12-23 NOTE — PROGRESS NOTES
Outpatient Physical Therapy Discharge Note     Patient: Tiara Smith  : 1950    Beginning/End Dates of Reporting Period:  20 to 2020    Referring Provider: mitzy escobedo MD    Therapy Diagnosis: cva     Client Self Report: HEP is going well , no falls and balance 90% back to normal     Objective Measurements:  Objective Measure: sit to stand 5x 24 seconds at eval   Details: sit to stand 5x 16 seconds currently   Objective Measure: is 0verall 90% better able to get dressed with her pants       patient seen for instruction in HEP and exercises in clinic , at last Rx she was completing the following   reviewed HEP bridges , SLR , hip abduction 10x ,nustep x 5 , rest , x 5 , at bar bosu x 1 minutes         Goals:  Goal Identifier 1   Goal Description instruction in HEP and compliant with it 5 of 7 days to improve quality of life    Target Date 21   Date Met      Progress:     Goal Identifier 2   Goal Description patient to be able to stand and dress her pants with good safety and strength    Target Date 21   Date Met      Progress:       Progress Toward Goals:       Progress Toward Goals:   Progress this reporting period: patient is very compliant with HEP and is meeting her PT goals at this point she feels she is doing well and is going to stop PT and continue her OT and speech           Plan:  Discharge from therapy.    Discharge:    Reason for Discharge: Patient has met all goals.  Patient chooses to discontinue therapy.    Equipment Issued: none    Discharge Plan: Patient to continue home program.

## 2020-12-31 ENCOUNTER — HOSPITAL ENCOUNTER (OUTPATIENT)
Dept: OCCUPATIONAL THERAPY | Facility: CLINIC | Age: 70
Setting detail: THERAPIES SERIES
End: 2020-12-31
Attending: STUDENT IN AN ORGANIZED HEALTH CARE EDUCATION/TRAINING PROGRAM
Payer: MEDICARE

## 2020-12-31 ENCOUNTER — HOSPITAL ENCOUNTER (OUTPATIENT)
Dept: SPEECH THERAPY | Facility: CLINIC | Age: 70
Setting detail: THERAPIES SERIES
End: 2020-12-31
Attending: STUDENT IN AN ORGANIZED HEALTH CARE EDUCATION/TRAINING PROGRAM
Payer: MEDICARE

## 2020-12-31 PROCEDURE — 92507 TX SP LANG VOICE COMM INDIV: CPT | Mod: GN | Performed by: SPEECH-LANGUAGE PATHOLOGIST

## 2020-12-31 PROCEDURE — 97112 NEUROMUSCULAR REEDUCATION: CPT | Mod: GO,59

## 2020-12-31 NOTE — PROGRESS NOTES
Outpatient Speech Language Pathology Discharge Note     Patient: Tiara Smith  : 1950    Beginning/End Dates of Reporting Period:  2020 to 2020    Referring Provider: Yan Ash MD    Therapy Diagnosis: Mild expressive aphasia    Client Self Report:  Patient arrived on time to today's session. She reports that despite continuing to have word finding difficulties, she would like for today to be her last day of therapies.        Goals:  Goal Identifier Word finding   Goal Description Patient will complete moderate level word finding task using compensatory word finding strategies with up to 90% accuracy given minimal cues.   Target Date 20   Date Met      Progress: Progressing. Patient completing word finding activities with approx. 70-90% accuracy. Accuracy varied between activity and from day to day.        Progress Toward Goals:    Progress this reporting period: Patient continues to demonstrate word finding difficulties in conversation, however, improved word finding abilities since time of initial evaluation.    Plan:  Discharge from therapy.    Discharge:    Reason for Discharge: Patient chooses to discontinue therapy.    Equipment Issued: None    Discharge Plan: Patient to continue home program.

## 2020-12-31 NOTE — PROGRESS NOTES
Outpatient Occupational Therapy Discharge Note     Patient: Tiara Smith  : 1950    Beginning/End Dates of Reporting Period:  20 to 2020    Referring Provider: Yan Ash Diagnosis: Impaired IADLs    Client Self Report: Pt wanting to DC today.  Reported feeling ready to start driving. Only drives on back roads.      Objective Measurements:     Objective Measure: dynavision   Details:   seated, mode A, lower R&L only: 48, 55.   Standing, full board, mode A: 49.   standing, full board, 2# sequence: 32 lights, 4 missed numbers; 24 lights with 1 missed number.     Outcome Measures (most recent score):  None on this date.     Goals:     Goal Identifier pt will complete dynavision task, 55 lights in 1 minute and 9/10 three digit numbers as a method to address pre-driving prerequisites   Goal Description return to driving screening, focused on reaction time, attention, and motor coordination   Target Date 21   Date Met      Progress: Pt continues to have difficulty with multi-tasking with dynavision, however, improved overall scanning, reaction speed, and awareness. (see above for details).      Goal Identifier RUE strength    Goal Description Pt will complete HEP as instructed 5 out of 7 days a week to improve UE strength   Target Date 22   Date Met  20   Progress: Goal met.        Progress Toward Goals:   Progress this reporting period: Patient completed 5 occupational therapy sessions.  Patient highly challenged with complex cognitive tasks, reaction speed, and strength.  Patient reported being ready for discharge.  OT continues to have concerns with multi-tasking or highly complex tasks, however, at this time if patient drives on known roads and avoids highways she appears she would be successful.  OT educated patient on starting to drive slowly at first with patient agreeing.     Plan:  Discharge from therapy.    Discharge:    Reason for Discharge: Patient  chooses to discontinue therapy.    Equipment Issued: none.     Discharge Plan: Patient to continue home program to challenge visual scanning and high complexity cognitive tasks.     Thank you for your referral.     EKATERINA Yo/L  [facility]  Occupational Therapy     Phone: [phone]  Email: [email or web address]

## 2021-01-04 DIAGNOSIS — I10 ESSENTIAL HYPERTENSION: ICD-10-CM

## 2021-01-04 RX ORDER — LISINOPRIL AND HYDROCHLOROTHIAZIDE 12.5; 2 MG/1; MG/1
TABLET ORAL
Qty: 30 TABLET | Refills: 1 | Status: SHIPPED | OUTPATIENT
Start: 2021-01-04 | End: 2021-02-19

## 2021-01-05 NOTE — TELEPHONE ENCOUNTER
Prescription approved per [facility] Refill Protocol.    Heidi Foster RN

## 2021-02-03 ENCOUNTER — NURSE TRIAGE (OUTPATIENT)
Dept: NURSING | Facility: CLINIC | Age: 71
End: 2021-02-03

## 2021-02-03 NOTE — TELEPHONE ENCOUNTER
"RN Triage:    \"Had stroke 3 months ago\"    Daughter-in-law, Vandana, reports that Nandini's blood pressure this evening is 171/110.  No other home blood pressures to compare to.  Nandini had headache, dizziness, nausea earlier today, but has been asymptomatic x 2 hours.  Vomiting x 3 earlier today.  Takes zestoretic daily; has not missed any doses.  Triage nurse advised evaluation this evening per guideline, but family would prefer to make an appointment in clinic tomorrow.  Caller was transferred to .    Gwen Griffin RN 02/03/21 6:09 PM  [facility] Nurse Advisor          Reason for Disposition    [1] Systolic BP  >= 160 OR Diastolic >= 100 AND [2] cardiac or neurologic symptoms (e.g., chest pain, difficulty breathing, unsteady gait, blurred vision)    Additional Information    Negative: Difficult to awaken or acting confused (e.g., disoriented, slurred speech)    Negative: Severe difficulty breathing (e.g., struggling for each breath, speaks in single words)    Negative: [1] Weakness of the face, arm or leg on one side of the body AND [2] new onset    Negative: [1] Numbness (i.e., loss of sensation) of the face, arm or leg on one side of the body AND [2] new onset    Negative: [1] Chest pain lasts > 5 minutes AND [2] history of heart disease  (i.e., heart attack, bypass surgery, angina, angioplasty, CHF)    Negative: [1] Chest pain AND [2] took nitrogylcerin AND [3] pain was not relieved    Negative: Sounds like a life-threatening emergency to the triager    Negative: Symptom is main concern  (e.g., headache, chest pain)    Negative: Low blood pressure is main concern    Negative: [1] Pregnant > 20 weeks (or postpartum < 6 weeks) AND [2] new hand or face swelling    Negative: [1] Pregnant > 20 weeks AND [2] BP Systolic BP  >= 140 OR Diastolic >= 90    Negative: [1] Systolic BP  >= 200 OR Diastolic >= 120  AND [2] having NO cardiac or neurologic symptoms    Negative: [1] Postpartum < 6 weeks " AND [2] BP Systolic BP  >= 140 OR Diastolic >= 90    Negative: [1] Systolic BP  >= 180 OR Diastolic >= 110 AND [2] missed most recent dose of blood pressure medication    Protocols used: HIGH BLOOD PRESSURE-A-AH

## 2021-02-04 ENCOUNTER — OFFICE VISIT (OUTPATIENT)
Dept: FAMILY MEDICINE | Facility: CLINIC | Age: 71
End: 2021-02-04
Payer: COMMERCIAL

## 2021-02-04 ENCOUNTER — TELEPHONE (OUTPATIENT)
Dept: FAMILY MEDICINE | Facility: CLINIC | Age: 71
End: 2021-02-04

## 2021-02-04 ENCOUNTER — HOSPITAL ENCOUNTER (OUTPATIENT)
Dept: CT IMAGING | Facility: CLINIC | Age: 71
Discharge: HOME | End: 2021-02-04
Attending: NURSE PRACTITIONER | Admitting: NURSE PRACTITIONER
Payer: COMMERCIAL

## 2021-02-04 VITALS
RESPIRATION RATE: 18 BRPM | OXYGEN SATURATION: 95 % | TEMPERATURE: 98.6 F | HEART RATE: 78 BPM | SYSTOLIC BLOOD PRESSURE: 129 MMHG | DIASTOLIC BLOOD PRESSURE: 86 MMHG

## 2021-02-04 DIAGNOSIS — I63.519 CEREBROVASCULAR ACCIDENT (CVA) DUE TO OCCLUSION OF MIDDLE CEREBRAL ARTERY, UNSPECIFIED BLOOD VESSEL LATERALITY (H): ICD-10-CM

## 2021-02-04 DIAGNOSIS — R42 VERTIGO: ICD-10-CM

## 2021-02-04 DIAGNOSIS — E78.5 HYPERLIPIDEMIA LDL GOAL <130: ICD-10-CM

## 2021-02-04 DIAGNOSIS — R11.2 NON-INTRACTABLE VOMITING WITH NAUSEA, UNSPECIFIED VOMITING TYPE: ICD-10-CM

## 2021-02-04 DIAGNOSIS — I10 HTN, GOAL BELOW 130/80: ICD-10-CM

## 2021-02-04 DIAGNOSIS — R42 VERTIGO: Primary | ICD-10-CM

## 2021-02-04 LAB
ALBUMIN SERPL-MCNC: 3.6 G/DL (ref 3.4–5)
ALP SERPL-CCNC: 108 U/L (ref 40–150)
ALT SERPL W P-5'-P-CCNC: 31 U/L (ref 0–50)
ANION GAP SERPL CALCULATED.3IONS-SCNC: 5 MMOL/L (ref 3–14)
AST SERPL W P-5'-P-CCNC: 21 U/L (ref 0–45)
BASOPHILS # BLD AUTO: 0 10E9/L (ref 0–0.2)
BASOPHILS NFR BLD AUTO: 0.3 %
BILIRUB SERPL-MCNC: 1.3 MG/DL (ref 0.2–1.3)
BUN SERPL-MCNC: 20 MG/DL (ref 7–30)
CALCIUM SERPL-MCNC: 9.5 MG/DL (ref 8.5–10.1)
CHLORIDE SERPL-SCNC: 108 MMOL/L (ref 94–109)
CO2 SERPL-SCNC: 27 MMOL/L (ref 20–32)
CREAT SERPL-MCNC: 1.04 MG/DL (ref 0.52–1.04)
CRP SERPL-MCNC: 6 MG/L (ref 0–8)
DIFFERENTIAL METHOD BLD: NORMAL
EOSINOPHIL NFR BLD AUTO: 1.2 %
ERYTHROCYTE [DISTWIDTH] IN BLOOD BY AUTOMATED COUNT: 15 % (ref 10–15)
ERYTHROCYTE [SEDIMENTATION RATE] IN BLOOD BY WESTERGREN METHOD: 10 MM/H (ref 0–30)
GFR SERPL CREATININE-BSD FRML MDRD: 54 ML/MIN/{1.73_M2}
GLUCOSE SERPL-MCNC: 95 MG/DL (ref 70–99)
HCT VFR BLD AUTO: 42.3 % (ref 35–47)
HGB BLD-MCNC: 13.7 G/DL (ref 11.7–15.7)
IMM GRANULOCYTES # BLD: 0 10E9/L (ref 0–0.4)
IMM GRANULOCYTES NFR BLD: 0.4 %
LYMPHOCYTES # BLD AUTO: 1.4 10E9/L (ref 0.8–5.3)
LYMPHOCYTES NFR BLD AUTO: 17.7 %
MCH RBC QN AUTO: 28.9 PG (ref 26.5–33)
MCHC RBC AUTO-ENTMCNC: 32.4 G/DL (ref 31.5–36.5)
MCV RBC AUTO: 89 FL (ref 78–100)
MONOCYTES # BLD AUTO: 0.5 10E9/L (ref 0–1.3)
MONOCYTES NFR BLD AUTO: 6.8 %
NEUTROPHILS # BLD AUTO: 5.7 10E9/L (ref 1.6–8.3)
NEUTROPHILS NFR BLD AUTO: 73.6 %
NRBC # BLD AUTO: 0 10*3/UL
NRBC BLD AUTO-RTO: 0 /100
PLATELET # BLD AUTO: 241 10E9/L (ref 150–450)
POTASSIUM SERPL-SCNC: 3.9 MMOL/L (ref 3.4–5.3)
PROT SERPL-MCNC: 7.2 G/DL (ref 6.8–8.8)
RBC # BLD AUTO: 4.74 10E12/L (ref 3.8–5.2)
SODIUM SERPL-SCNC: 140 MMOL/L (ref 133–144)
WBC # BLD AUTO: 7.8 10E9/L (ref 4–11)

## 2021-02-04 PROCEDURE — 93000 ELECTROCARDIOGRAM COMPLETE: CPT | Performed by: NURSE PRACTITIONER

## 2021-02-04 PROCEDURE — 99215 OFFICE O/P EST HI 40 MIN: CPT | Performed by: NURSE PRACTITIONER

## 2021-02-04 PROCEDURE — 70450 CT HEAD/BRAIN W/O DYE: CPT

## 2021-02-04 PROCEDURE — 36415 COLL VENOUS BLD VENIPUNCTURE: CPT | Performed by: NURSE PRACTITIONER

## 2021-02-04 PROCEDURE — 85025 COMPLETE CBC W/AUTO DIFF WBC: CPT | Performed by: NURSE PRACTITIONER

## 2021-02-04 PROCEDURE — 86140 C-REACTIVE PROTEIN: CPT | Performed by: NURSE PRACTITIONER

## 2021-02-04 PROCEDURE — 85652 RBC SED RATE AUTOMATED: CPT | Performed by: NURSE PRACTITIONER

## 2021-02-04 PROCEDURE — 80053 COMPREHEN METABOLIC PANEL: CPT | Performed by: NURSE PRACTITIONER

## 2021-02-04 NOTE — TELEPHONE ENCOUNTER
Called and spoke to patient regarding her appt that was scheduled with Merissa Moran at 820. Provider does not see for these types of visits (blood pressure concerns). I was able to get her rescheduled to see Elba Angelo at 8:30 today and now patient has been informed of this change.Naomi Sal CMA ([facility])

## 2021-02-04 NOTE — PATIENT INSTRUCTIONS
Patient Education     Benign Paroxysmal Positional Vertigo     Your health care provider may move your head in certain ways to treat your BPPV.   Benign paroxysmal positional vertigo (BPPV) is a problem with the inner ear. The inner ear contains the vestibular system. This system is what helps you keep your balance. BPPV causes a feeling of spinning. It is a common problem of the vestibular system.   Understanding the vestibular system  The vestibular system of the ear is made up of very tiny parts. They include the utricle, saccule, and semicircular canals. The utricle is a tiny organ that contains calcium crystals. In some people, the crystals can move into the semicircular canals. When this happens, the system no longer works as it should. This causes BPPV. Benign means it is not life threatening. Paroxysmal means it happens suddenly. Positional means that it happens when you move your head. Vertigo is a feeling of spinning.   What causes BPPV?  Causes include injury to your head or neck. Other problems with the vestibular system may cause BPPV. In many people, the cause of BPPV is not known.   Symptoms of BPPV  You may have repeated feelings of spinning (vertigo). The vertigo usually lasts less than 1 minute. Some movements, such as rolling over in bed, can bring on vertigo.   Diagnosing BPPV  Your primary healthcare provider may diagnose and treat your BPPV. Or you may see an ear, nose, and throat healthcare provider (otolaryngologist). In some cases, you may see a healthcare provider who focuses on the nervous system (neurologist).   The healthcare provider will ask about your symptoms and your medical history. He or she will examine you. You may have hearing and balance tests. As part of the exam, your healthcare provider may have you move your head and body in certain ways. If you have BPPV, the movements can bring on vertigo. Your provider will also look for abnormal movements of your eyes. You may have  other tests to check your vestibular or nervous systems.   Treatment for BPPV  Your healthcare provider may try to move the calcium crystals. This is done by having you move your head and neck in certain ways. This treatment is safe and often works well. You may also be told to do these movements at home. You may still have vertigo for a few weeks. Your healthcare provider will recheck your symptoms, usually in about a month. Special physical therapy may also be part of treatment. In rare cases, surgery may be needed for BPPV that does not go away. Talk with your healthcare provider about whether it is safe for you to drive.   When to call the healthcare provider  Call your healthcare provider right away if you have any of these:    Symptoms that do not go away with treatment    Symptoms that get worse    New symptoms  Robert last reviewed this educational content on 2/1/2020 2000-2020 [employer]. [address] [place] [address], [place] [address]. All rights reserved. This information is not intended as a substitute for professional medical care. Always follow your healthcare professional's instructions.
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
## 2021-02-19 ENCOUNTER — OFFICE VISIT (OUTPATIENT)
Dept: FAMILY MEDICINE | Facility: CLINIC | Age: 71
End: 2021-02-19
Payer: COMMERCIAL

## 2021-02-19 DIAGNOSIS — I63.519 CEREBROVASCULAR ACCIDENT (CVA) DUE TO OCCLUSION OF MIDDLE CEREBRAL ARTERY, UNSPECIFIED BLOOD VESSEL LATERALITY (H): ICD-10-CM

## 2021-02-19 DIAGNOSIS — R42 VERTIGO: ICD-10-CM

## 2021-02-19 DIAGNOSIS — Z12.31 ENCOUNTER FOR SCREENING MAMMOGRAM FOR MALIGNANT NEOPLASM OF BREAST: ICD-10-CM

## 2021-02-19 DIAGNOSIS — I10 HTN, GOAL BELOW 130/80: Primary | ICD-10-CM

## 2021-02-19 PROCEDURE — 99214 OFFICE O/P EST MOD 30 MIN: CPT | Performed by: FAMILY MEDICINE

## 2021-02-19 ASSESSMENT — PAIN SCALES - GENERAL: PAINLEVEL: NO PAIN (0)

## 2021-02-19 ASSESSMENT — MIFFLIN-ST. JEOR: SCORE: 1395.42

## 2021-02-19 NOTE — PROGRESS NOTES
Assessment & Plan       ICD-10-CM    1. HTN, goal below 130/80  I10 lisinopril (ZESTRIL) 30 MG tablet     hydrochlorothiazide (HYDRODIURIL) 25 MG tablet   2. Cerebrovascular accident (CVA) due to occlusion of middle cerebral artery, unspecified blood vessel laterality (H)  I63.519 lisinopril (ZESTRIL) 30 MG tablet     hydrochlorothiazide (HYDRODIURIL) 25 MG tablet   3. Encounter for screening mammogram for malignant neoplasm of breast  Z12.31 *MA Screening Digital Bilateral   4. Vertigo  R42      Tiara's blood pressure today is slightly elevated.  Due to history of stroke, the goal for her blood pressure is to be in the 120s/80s.  Stopped the Zestoretic and started her on the lisinopril at 30 mg and hydrochlorothiazide 25 mg daily.  Continue with Lipitor.  Educated her about the hypotensive symptoms that need to be seen like lightheadedness, dizziness, or fainting feelings. Follow up in 2 weeks for blood pressure check with nursing staffs.  UTD for labs.      Her vertigo is now resolved.  Will continue to monitor.    Also recommended breast cancer screening with mammogram.  Patient is not interested.  Mammogram was ordered however and recommended her to call in for it if she ever changes her mind.      Follow up in 2 weeks for blood pressure check.       Deo Love Mai, MD  [facility]   Tiara is a 70 year old who presents for the following health issues     Follow up visit from her vertigo episode 2 weeks ago    HPI   Follow up on dizziness- Dizziness has resolved.       Hypertension Follow-up      Do you check your blood pressure regularly outside of the clinic? Yes     Are you following a low salt diet? Yes    Are your blood pressures ever more than 140 on the top number (systolic) OR more   than 90 on the bottom number (diastolic), for example 140/90? No Last bp at home was 131/64      How many servings of fruits and vegetables do you eat daily?  0-1    On average, how  "many sweetened beverages do you drink each day (Examples: soda, juice, sweet tea, etc.  Do NOT count diet or artificially sweetened beverages)?   2    How many days per week do you exercise enough to make your heart beat faster? 3 or less    How many minutes a day do you exercise enough to make your heart beat faster? 10 - 19    How many days per week do you miss taking your medication? 0    Tiara comes in today for a follow up vertigo and HTN.   She had stroke recently.  Extensive work up for the vertigo was normal/negative.  Stated that the vertigo resolved couple days after the visit and has been doing well since then. Been rinsing her ear with olive oil and mineral oil and they seem to do the trick.  Overall she is doing well - no concern.  No dizziness, blurry vision, diplopia. No nausea or vomiting. No headaches or weakness.  No chest pain, SOB, heart palpations. Also has HTN, taking Zestoretic as prescribed.  Blood pressure at home has been around 140 systolic. Concerned about her bp and is wondering if she needs to increase her medication. She had a CVA about 6 months ago. Taking Lipitor 40 mg and Lisinopril-hydrochlorothiazide 20-12.5 mg.  No leg swelling, orthopnea or dyspnea.  Denied of excessive salt or caffeine intake.        Review of Systems   Constitutional, HEENT, cardiovascular, pulmonary, gi and gu systems are negative, except as otherwise noted.      Objective    BP (!) 150/94   Pulse 96   Temp 98.1  F (36.7  C) (Temporal)   Resp 18   Ht 1.651 m (5' 5\")   Wt 87.5 kg (192 lb 12.8 oz)   LMP  (LMP Unknown)   SpO2 95%   BMI 32.08 kg/m    Body mass index is 32.08 kg/m .  Physical Exam   GENERAL: healthy, alert and no distress  EYES: Eyes grossly normal to inspection, PERRL and conjunctivae and sclerae normal  HENT: ear canals and TM's normal.  Nares are non-congested. Oropharynx is pink and moist. No tender with palpation to the sinuses.   RESP: lungs clear to auscultation - no rales, " rhonchi or wheezes  CV: regular rate and rhythm, no murmur, no peripheral edema with strong pedal pulses bilaterally  MS: Weakness in right leg from stroke. Walks with limping to the right.     NEURO: weakness of right leg from CVA. Mentation intact.  No focal neurological deficit.    No results found for any visits on 02/19/21.

## 2021-02-22 ENCOUNTER — TELEPHONE (OUTPATIENT)
Dept: FAMILY MEDICINE | Facility: CLINIC | Age: 71
End: 2021-02-22

## 2021-02-22 VITALS
BODY MASS INDEX: 32.12 KG/M2 | RESPIRATION RATE: 18 BRPM | TEMPERATURE: 98.1 F | HEART RATE: 96 BPM | SYSTOLIC BLOOD PRESSURE: 150 MMHG | OXYGEN SATURATION: 95 % | WEIGHT: 192.8 LBS | DIASTOLIC BLOOD PRESSURE: 94 MMHG | HEIGHT: 65 IN

## 2021-02-22 RX ORDER — LISINOPRIL 30 MG/1
30 TABLET ORAL DAILY
Qty: 30 TABLET | Refills: 1 | Status: SHIPPED | OUTPATIENT
Start: 2021-02-22 | End: 2021-04-20

## 2021-02-22 RX ORDER — HYDROCHLOROTHIAZIDE 25 MG/1
25 TABLET ORAL DAILY
Qty: 30 TABLET | Refills: 1 | Status: SHIPPED | OUTPATIENT
Start: 2021-02-22 | End: 2021-04-20

## 2021-02-22 NOTE — TELEPHONE ENCOUNTER
Reason for Call:  Medication or medication refill:    Do you use a [facility]?  Name of the pharmacy and phone number for the current request:  [facility]    Name of the medication requested: patient didn't know name of new BP medication that was supposed to be sent to pharmacy     Other request: na    Can we leave a detailed message on this number? YES    Phone number patient can be reached at: Home number on file [phone] (home)    Best Time: any    Call taken on 2/22/2021 at 10:01 AM by Linda Hernandez

## 2021-02-26 DIAGNOSIS — Z12.11 COLON CANCER SCREENING: ICD-10-CM

## 2021-02-26 PROCEDURE — 82274 ASSAY TEST FOR BLOOD FECAL: CPT | Performed by: FAMILY MEDICINE

## 2021-03-01 DIAGNOSIS — I63.519 CEREBROVASCULAR ACCIDENT (CVA) DUE TO OCCLUSION OF MIDDLE CEREBRAL ARTERY, UNSPECIFIED BLOOD VESSEL LATERALITY (H): ICD-10-CM

## 2021-03-01 RX ORDER — ATORVASTATIN CALCIUM 40 MG/1
40 TABLET, FILM COATED ORAL EVERY EVENING
Qty: 30 TABLET | Refills: 5 | Status: SHIPPED | OUTPATIENT
Start: 2021-03-01 | End: 2021-09-01

## 2021-03-01 NOTE — TELEPHONE ENCOUNTER
Prescription approved per [facility] Refill Protocol.    Heidi Foster RN

## 2021-03-03 LAB — HEMOCCULT STL QL IA: NEGATIVE

## 2021-03-05 ENCOUNTER — ALLIED HEALTH/NURSE VISIT (OUTPATIENT)
Dept: FAMILY MEDICINE | Facility: CLINIC | Age: 71
End: 2021-03-05
Payer: COMMERCIAL

## 2021-03-05 VITALS — DIASTOLIC BLOOD PRESSURE: 88 MMHG | HEART RATE: 80 BPM | SYSTOLIC BLOOD PRESSURE: 110 MMHG

## 2021-03-05 DIAGNOSIS — Z01.30 BP CHECK: Primary | ICD-10-CM

## 2021-03-05 PROCEDURE — 99207 PR NO CHARGE NURSE ONLY: CPT

## 2021-06-13 ENCOUNTER — HOSPITAL ENCOUNTER (EMERGENCY)
Facility: CLINIC | Age: 71
Discharge: HOME | End: 2021-06-13
Attending: NURSE PRACTITIONER | Admitting: NURSE PRACTITIONER
Payer: COMMERCIAL

## 2021-06-13 VITALS
BODY MASS INDEX: 33.86 KG/M2 | TEMPERATURE: 98.1 F | OXYGEN SATURATION: 97 % | DIASTOLIC BLOOD PRESSURE: 97 MMHG | HEART RATE: 74 BPM | SYSTOLIC BLOOD PRESSURE: 157 MMHG | RESPIRATION RATE: 18 BRPM | WEIGHT: 203.5 LBS

## 2021-06-13 DIAGNOSIS — S91.312A LACERATION OF LEFT HEEL, INITIAL ENCOUNTER: ICD-10-CM

## 2021-06-13 PROCEDURE — 12002 RPR S/N/AX/GEN/TRNK2.6-7.5CM: CPT | Performed by: NURSE PRACTITIONER

## 2021-06-13 PROCEDURE — 99283 EMERGENCY DEPT VISIT LOW MDM: CPT | Performed by: NURSE PRACTITIONER

## 2021-06-13 PROCEDURE — 99282 EMERGENCY DEPT VISIT SF MDM: CPT | Mod: 25 | Performed by: NURSE PRACTITIONER

## 2021-06-14 NOTE — ED PROVIDER NOTES
History     Chief Complaint   Patient presents with     Laceration     HPI  Tiara Smith is a 70 year old female who presents to the emergency department with her  for evaluation of laceration to her left heel.  Occurred a couple hours ago.  Patient states she tripped by her door, falling and cutting her back of her heel on the door.  She did not hit her head or have loss of consciousness.  She states she did not even know that she had cut her heel until a bit later when she noted some blood on her sock.  She denies any neck or back pain.  She arrives here ambulatory.  Tetanus is up-to-date.    Allergies:  Allergies   Allergen Reactions     Codeine      thick tongue, diff. speaking to [employer] #[employer]       Problem List:    Patient Active Problem List    Diagnosis Date Noted     CVA (cerebral vascular accident) (H) 11/03/2020     Priority: Medium     Advanced directives, counseling/discussion 05/21/2013     Priority: Medium     Advance Care Planning:   ACP Review and Resources Provided:  Reviewed chart for advance care plan.  Tiara Smith has no plan or code status on file. Discussed available resources and provided with information. Confirmed code status reflects current choices pending further ACP discussions.  Confirmed/documented designated decision maker(s). See permanent comments section of demographics in clinical tab.   Added by Paula Merino on 5/21/2013             Hyperlipidemia LDL goal <130 08/15/2011     Priority: Medium     HTN, goal below 130/80 10/03/2003     Priority: Medium        Past Medical History:    Past Medical History:   Diagnosis Date     Cerebral infarction (H)      Hyperlipidemia      Lumbago      Unspecified essential hypertension        Past Surgical History:    Past Surgical History:   Procedure Laterality Date     HC COLONOSCOPY W/WO BRUSH/WASH  3/29/2005     HC EXCISION BREAST LESION W XRAY MARKER, OPEN SINGLE  3/25/2005    Left breast.       Family History:     Family History   Problem Relation Age of Onset     Osteoporosis Mother      Neurologic Disorder Mother      Genitourinary Problems Mother      Depression Mother      Eye Disorder Mother      Hypertension Father      Cerebrovascular Disease Father      Alcohol/Drug Father      Allergies Father      Cancer Father         lung cancer - smoker     Lipids Father      Arthritis Father      Coronary Artery Disease Father      Cancer Maternal Grandfather         melanoma     No Known Problems Son      Gynecology Daughter      No Known Problems Sister      No Known Problems Son      No Known Problems Son        Social History:  Marital Status:   [2]  Social History     Tobacco Use     Smoking status: Former Smoker     Packs/day: 0.50     Years: 30.00     Pack years: 15.00     Types: Cigarettes     Smokeless tobacco: Never Used     Tobacco comment: Agreed to NRT while inpatient and at discharge   Substance Use Topics     Alcohol use: No     Comment: occasional     Drug use: No        Medications:    aspirin (ASA) 81 MG chewable tablet  atorvastatin (LIPITOR) 40 MG tablet  hydrochlorothiazide (HYDRODIURIL) 25 MG tablet  lisinopril (ZESTRIL) 30 MG tablet          Review of Systems  As mentioned above in the history present illness. All other systems were reviewed and are negative.    Physical Exam   BP: (!) 157/97  Pulse: 74  Temp: 98.1  F (36.7  C)  Resp: 18  Weight: 92.3 kg (203 lb 8 oz)  SpO2: 97 %      Physical Exam  Constitutional:       General: She is not in acute distress.     Appearance: Normal appearance. She is well-developed. She is not ill-appearing.   HENT:      Head: Normocephalic and atraumatic.      Right Ear: External ear normal.      Left Ear: External ear normal.      Nose: Nose normal.      Mouth/Throat:      Pharynx: No oropharyngeal exudate.   Eyes:      General: No scleral icterus.     Conjunctiva/sclera: Conjunctivae normal.   Cardiovascular:      Rate and Rhythm: Normal rate.   Pulmonary:       Effort: Pulmonary effort is normal. No respiratory distress.   Musculoskeletal: Normal range of motion.   Skin:     General: Skin is warm and dry.      Findings: Laceration (4cm left heel laceration. wound edges aproximate well ) present. No rash.          Neurological:      General: No focal deficit present.      Mental Status: She is alert and oriented to person, place, and time.         ED [facility]    -Laceration Repair    Date/Time: 6/13/2021 9:47 PM  Performed by: Yina Castillo APRN CNP  Authorized by: Yina Castillo APRN CNP       ANESTHESIA (see MAR for exact dosages):     Anesthesia method:  Local infiltration    Local anesthetic:  Lidocaine 1% WITH epi  LACERATION DETAILS     Location:  Foot    Foot location:  L heel    Length (cm):  4    REPAIR TYPE:     Repair type:  Simple      EXPLORATION:     Wound exploration: wound explored through full range of motion and entire depth of wound probed and visualized      Contaminated: no      TREATMENT:     Area cleansed with:  Saline    SKIN REPAIR     Repair method:  Sutures    Suture size:  3-0    Number of sutures:  8    APPROXIMATION     Approximation:  Close    POST-PROCEDURE DETAILS     Dressing:  Antibiotic ointment and adhesive bandage      PROCEDURE   Patient Tolerance:  Patient tolerated the procedure well with no immediate complications               No results found for this or any previous visit (from the past 24 hour(s)).    Medications - No data to display    Assessments & Plan (with Medical Decision Making)   Ok to shower. Then dry well and keep covered with bacitracin and bandage.  Change bandage twice a day.  Follow-up in clinic in 10 days for suture removal.  Return for increased redness, swelling or pain.  I have reviewed the nursing notes.    I have reviewed the findings, diagnosis, plan and need for follow up with the patient.      New Prescriptions    No medications on file        Final diagnoses:   Laceration of left heel, initial encounter       6/13/2021   [facility] EMERGENCY DEPT     Anna, Yina Hall, LUCÍA CNP  06/13/21 1968

## 2021-06-14 NOTE — DISCHARGE INSTRUCTIONS
Ok to shower. Then dry well and keep covered with bacitracin and bandage.  Change bandage twice a day.  Follow-up in clinic in 10 days for suture removal.  Return for increased redness, swelling or pain.

## 2021-06-23 ENCOUNTER — ALLIED HEALTH/NURSE VISIT (OUTPATIENT)
Dept: FAMILY MEDICINE | Facility: CLINIC | Age: 71
End: 2021-06-23
Payer: COMMERCIAL

## 2021-06-23 DIAGNOSIS — Z48.02 ENCOUNTER FOR REMOVAL OF SUTURES: Primary | ICD-10-CM

## 2021-06-23 PROCEDURE — 99207 PR NO CHARGE NURSE ONLY: CPT

## 2021-06-23 NOTE — PROGRESS NOTES
Tiaramakayla Smith presents to the clinic today for  removal of sutures.  The patient has had the sutures in place for 10 days.    There has been no history of infection or drainage.    O: 8 sutures are seen located on the left heel.  The wound is healing well with no signs of infection.    Tetanus status is up to date.    A: Suture removal.    P:  All sutures were easily removed today.  Routine wound care discussed.  The patient will follow up as needed.    Patient expressed verbal understanding of wound care and when to follow up in clinic.     Heidi Foster Rn

## 2021-08-23 DIAGNOSIS — I10 HTN, GOAL BELOW 130/80: ICD-10-CM

## 2021-08-23 DIAGNOSIS — I63.519 CEREBROVASCULAR ACCIDENT (CVA) DUE TO OCCLUSION OF MIDDLE CEREBRAL ARTERY, UNSPECIFIED BLOOD VESSEL LATERALITY (H): ICD-10-CM

## 2021-08-24 RX ORDER — LISINOPRIL 30 MG/1
30 TABLET ORAL DAILY
Qty: 90 TABLET | Refills: 0 | Status: SHIPPED | OUTPATIENT
Start: 2021-08-24 | End: 2021-11-22

## 2021-08-24 RX ORDER — HYDROCHLOROTHIAZIDE 25 MG/1
25 TABLET ORAL DAILY
Qty: 90 TABLET | Refills: 0 | Status: SHIPPED | OUTPATIENT
Start: 2021-08-24 | End: 2021-11-22

## 2021-08-24 NOTE — TELEPHONE ENCOUNTER
Spoke to patient, informed of message below and scheduled for BP check.  Maci Stringer CMA on 8/24/2021 at 4:34 PM

## 2021-08-24 NOTE — TELEPHONE ENCOUNTER
Routing refill request to provider for review/approval because:  Elevated BP on file.     Heidi Foster RN

## 2021-08-26 ENCOUNTER — ALLIED HEALTH/NURSE VISIT (OUTPATIENT)
Dept: FAMILY MEDICINE | Facility: CLINIC | Age: 71
End: 2021-08-26
Payer: COMMERCIAL

## 2021-08-26 VITALS — DIASTOLIC BLOOD PRESSURE: 82 MMHG | SYSTOLIC BLOOD PRESSURE: 124 MMHG | HEART RATE: 76 BPM

## 2021-08-26 DIAGNOSIS — I10 ESSENTIAL HYPERTENSION: Primary | ICD-10-CM

## 2021-08-26 PROCEDURE — 99207 PR NO CHARGE NURSE ONLY: CPT

## 2021-08-26 NOTE — NURSING NOTE
Tiara Smith is a 70 year old patient who comes in today for a Blood Pressure check.  Initial BP:  /82 (BP Location: Left arm, Patient Position: Chair, Cuff Size: Adult Large)   Pulse 76   LMP  (LMP Unknown)      76  Disposition: follow-up as previously indicated by provider

## 2021-09-01 DIAGNOSIS — I63.519 CEREBROVASCULAR ACCIDENT (CVA) DUE TO OCCLUSION OF MIDDLE CEREBRAL ARTERY, UNSPECIFIED BLOOD VESSEL LATERALITY (H): ICD-10-CM

## 2021-09-01 RX ORDER — ATORVASTATIN CALCIUM 40 MG/1
40 TABLET, FILM COATED ORAL EVERY EVENING
Qty: 30 TABLET | Refills: 4 | Status: SHIPPED | OUTPATIENT
Start: 2021-09-01 | End: 2022-02-04

## 2021-11-22 DIAGNOSIS — I10 HTN, GOAL BELOW 130/80: ICD-10-CM

## 2021-11-22 DIAGNOSIS — I63.519 CEREBROVASCULAR ACCIDENT (CVA) DUE TO OCCLUSION OF MIDDLE CEREBRAL ARTERY, UNSPECIFIED BLOOD VESSEL LATERALITY (H): ICD-10-CM

## 2021-11-22 RX ORDER — HYDROCHLOROTHIAZIDE 25 MG/1
25 TABLET ORAL DAILY
Qty: 90 TABLET | Refills: 0 | Status: SHIPPED | OUTPATIENT
Start: 2021-11-22 | End: 2022-03-02

## 2021-11-22 RX ORDER — LISINOPRIL 30 MG/1
30 TABLET ORAL DAILY
Qty: 90 TABLET | Refills: 0 | Status: SHIPPED | OUTPATIENT
Start: 2021-11-22 | End: 2022-03-02

## 2021-11-22 NOTE — TELEPHONE ENCOUNTER
Prescription approved per [facility] Refill Protocol.    Danay Barlow RN

## 2022-02-04 DIAGNOSIS — I63.519 CEREBROVASCULAR ACCIDENT (CVA) DUE TO OCCLUSION OF MIDDLE CEREBRAL ARTERY, UNSPECIFIED BLOOD VESSEL LATERALITY (H): ICD-10-CM

## 2022-02-04 RX ORDER — ATORVASTATIN CALCIUM 40 MG/1
40 TABLET, FILM COATED ORAL EVERY EVENING
Qty: 30 TABLET | Refills: 0 | Status: SHIPPED | OUTPATIENT
Start: 2022-02-04 | End: 2022-03-03

## 2022-02-04 NOTE — TELEPHONE ENCOUNTER
1 month supply refilled, please follow-up before med run out.  Recommend to follow-up for medication follow-up and physical.

## 2022-02-04 NOTE — TELEPHONE ENCOUNTER
Routing refill request to provider for review/approval because:  Labs not current:  KOURTNEY Foster Rn

## 2022-03-01 DIAGNOSIS — I63.519 CEREBROVASCULAR ACCIDENT (CVA) DUE TO OCCLUSION OF MIDDLE CEREBRAL ARTERY, UNSPECIFIED BLOOD VESSEL LATERALITY (H): ICD-10-CM

## 2022-03-01 DIAGNOSIS — I10 HTN, GOAL BELOW 130/80: ICD-10-CM

## 2022-03-02 DIAGNOSIS — I63.519 CEREBROVASCULAR ACCIDENT (CVA) DUE TO OCCLUSION OF MIDDLE CEREBRAL ARTERY, UNSPECIFIED BLOOD VESSEL LATERALITY (H): ICD-10-CM

## 2022-03-02 RX ORDER — LISINOPRIL 30 MG/1
30 TABLET ORAL DAILY
Qty: 90 TABLET | Refills: 0 | Status: SHIPPED | OUTPATIENT
Start: 2022-03-02 | End: 2022-06-06

## 2022-03-02 RX ORDER — HYDROCHLOROTHIAZIDE 25 MG/1
25 TABLET ORAL DAILY
Qty: 90 TABLET | Refills: 0 | Status: SHIPPED | OUTPATIENT
Start: 2022-03-02 | End: 2022-06-06

## 2022-03-02 NOTE — TELEPHONE ENCOUNTER
Hydrodiuril  Zestril  Medication is being filled for 1 time refill only due to:  Patient has scheduled office visit for further refills     Rosmery Barnes RN

## 2022-03-03 RX ORDER — ATORVASTATIN CALCIUM 40 MG/1
40 TABLET, FILM COATED ORAL EVERY EVENING
Qty: 30 TABLET | Refills: 0 | Status: SHIPPED | OUTPATIENT
Start: 2022-03-03 | End: 2022-04-21

## 2022-03-03 NOTE — TELEPHONE ENCOUNTER
Routed to covering provider as PCP is out of office    Routing refill request to provider for review/approval because:  Labs not current:  KOURTNEY Barlow RN

## 2022-03-21 ENCOUNTER — OFFICE VISIT (OUTPATIENT)
Dept: FAMILY MEDICINE | Facility: CLINIC | Age: 72
End: 2022-03-21
Payer: COMMERCIAL

## 2022-03-21 VITALS
RESPIRATION RATE: 18 BRPM | OXYGEN SATURATION: 99 % | SYSTOLIC BLOOD PRESSURE: 128 MMHG | DIASTOLIC BLOOD PRESSURE: 70 MMHG | TEMPERATURE: 98.7 F | WEIGHT: 191 LBS | HEIGHT: 65 IN | BODY MASS INDEX: 31.82 KG/M2 | HEART RATE: 82 BPM

## 2022-03-21 DIAGNOSIS — E78.5 HYPERLIPIDEMIA LDL GOAL <130: Primary | ICD-10-CM

## 2022-03-21 DIAGNOSIS — I63.519 CEREBROVASCULAR ACCIDENT (CVA) DUE TO OCCLUSION OF MIDDLE CEREBRAL ARTERY, UNSPECIFIED BLOOD VESSEL LATERALITY (H): ICD-10-CM

## 2022-03-21 DIAGNOSIS — Z12.11 COLON CANCER SCREENING: ICD-10-CM

## 2022-03-21 DIAGNOSIS — I10 HTN, GOAL BELOW 130/80: ICD-10-CM

## 2022-03-21 LAB
ALBUMIN SERPL-MCNC: 3.7 G/DL (ref 3.4–5)
ALP SERPL-CCNC: 106 U/L (ref 40–150)
ALT SERPL W P-5'-P-CCNC: 28 U/L (ref 0–50)
ANION GAP SERPL CALCULATED.3IONS-SCNC: 5 MMOL/L (ref 3–14)
AST SERPL W P-5'-P-CCNC: 29 U/L (ref 0–45)
BILIRUB SERPL-MCNC: 0.8 MG/DL (ref 0.2–1.3)
BUN SERPL-MCNC: 16 MG/DL (ref 7–30)
CALCIUM SERPL-MCNC: 9.4 MG/DL (ref 8.5–10.1)
CHLORIDE BLD-SCNC: 101 MMOL/L (ref 94–109)
CHOLEST SERPL-MCNC: 137 MG/DL
CO2 SERPL-SCNC: 27 MMOL/L (ref 20–32)
CREAT SERPL-MCNC: 1.15 MG/DL (ref 0.52–1.04)
FASTING STATUS PATIENT QL REPORTED: YES
GFR SERPL CREATININE-BSD FRML MDRD: 51 ML/MIN/1.73M2
GLUCOSE BLD-MCNC: 98 MG/DL (ref 70–99)
HDLC SERPL-MCNC: 62 MG/DL
LDLC SERPL CALC-MCNC: 51 MG/DL
NONHDLC SERPL-MCNC: 75 MG/DL
POTASSIUM BLD-SCNC: 4.4 MMOL/L (ref 3.4–5.3)
PROT SERPL-MCNC: 7.3 G/DL (ref 6.8–8.8)
SODIUM SERPL-SCNC: 133 MMOL/L (ref 133–144)
TRIGL SERPL-MCNC: 118 MG/DL

## 2022-03-21 PROCEDURE — 99214 OFFICE O/P EST MOD 30 MIN: CPT | Performed by: FAMILY MEDICINE

## 2022-03-21 PROCEDURE — 80053 COMPREHEN METABOLIC PANEL: CPT | Performed by: FAMILY MEDICINE

## 2022-03-21 PROCEDURE — 36415 COLL VENOUS BLD VENIPUNCTURE: CPT | Performed by: FAMILY MEDICINE

## 2022-03-21 PROCEDURE — 80061 LIPID PANEL: CPT | Performed by: FAMILY MEDICINE

## 2022-03-21 ASSESSMENT — PAIN SCALES - GENERAL: PAINLEVEL: NO PAIN (0)

## 2022-03-21 NOTE — PROGRESS NOTES
Assessment & Plan     (E78.5) Hyperlipidemia LDL goal <100  (primary encounter diagnosis)  Comment: Also has high blood pressure and history of stroke.  The goal for LDL to be less than 100.  Tolerating the Lipitor well.  No history of liver disease.  Her liver enzymes today were normal.  No excessive alcohol intake.  Cholesterol level today is therapeutic.  Will continue with the Lipitor at 40 mg daily.  Emphasized on exercising, healthy diet and weight management.  Recheck the cholesterol level in a year.    Plan: Lipid panel reflex to direct LDL Fasting,         Comprehensive metabolic panel (BMP + Alb, Alk         Phos, ALT, AST, Total. Bili, TP)            (I10) HTN, goal below 130/80  Comment: Also has high cholesterol and history of stroke.  The goal for her blood pressure to be less than 130/80.  Tolerating the hydrochlorothiazide and lisinopril well.  Blood pressure was therapeutic today and has been stable.  Strongly encouraged her to stay away from high salt and caffeine intake.  Emphasized on exercising, healthy diet and weight loss.  Will continue with the lisinopril and hydrochlorothiazide for now.  Labs as ordered, her creatinine was slightly elevated, will keep an eye on it closely.  Electrolytes were normal.    Plan: Comprehensive metabolic panel (BMP + Alb, Alk         Phos, ALT, AST, Total. Bili, TP)            (I63.519) Cerebrovascular accident (CVA) due to occlusion of middle cerebral artery, unspecified blood vessel laterality (H)  Comment: Stable and has been doing well.  Both cholesterol and high blood pressure are well controlled.  She does not smoke.  No drugs.  She is obese with a BMI of 32, encouraged to work on weight loss.  Will continue with the Lipitor and aspirin for life.  Symptoms that need to be seen or call in discussed.    Plan: Lipid panel reflex to direct LDL Fasting            (Z12.11) Colon cancer screening  Comment: Discussed with her about options for colon cancer  "screening which include colonoscopy, Cologuard or FIT test.  Pros and cons of each option discussed.  She absolutely will not consider colonoscopy, she preferred Cologuard therefore it was ordered.  She was informed that positive Cologuard will require further evaluation with colonoscopy.  She also informed that normal Cologuard will be repeat every 3 years.    Plan: COLOGUARD([employer])         I again, emphasized the importance of preventive care.  She declined mammogram or DEXA scan.  Also declined influenza and shingle vaccination.    BMI:   Estimated body mass index is 31.78 kg/m  as calculated from the following:    Height as of this encounter: 1.651 m (5' 5\").    Weight as of this encounter: 86.6 kg (191 lb).   Weight management plan: Discussed healthy diet and exercise guidelines      Return in about 6 months (around 9/21/2022) for Physical Exam.    Deo Love Mai, MD  [facility]    Misti Menjivar is a 71 year old who presents for the following health issues     HPI     Medication Followup     Taking Medication as prescribed: yes    Side Effects:  None    Medication Helping Symptoms:  yes     Tiara is here today for general follow-up.  She was offered to be seen as a Medicare physical today but she declined.  She did not want any preventive care including mammogram, in immunization or DEXA scan.  Also declined lung cancer screening.    She had a stroke couple years ago with good recovery and has been doing well.  No significant affect from the stroke, been able to live independently and with no problem with taking care of herself.  She also has high cholesterol and high blood pressure.  Been taking the medications as prescribed - taking lisinopril, hydrochlorothiazide, Lipitor and aspirin -no side effect.  Stated that she has been feeling good, no concerns today.  No headache or dizziness.  No acute change in her vision.  No chest pain or shortness of breath.  No leg " "swelling, orthopnea or dyspnea.  No nausea vomiting, diarrhea or constipation.  No problem with swallowing with normal appetite.  No speech problem or memory concerns.  Sleeping okay.  No weakness.  Not exercising but always keep herself active.  No pain.  No other concern.    Review of Systems   Constitutional, HEENT, cardiovascular, pulmonary, gi and gu systems are negative, except as otherwise noted.      Objective    /70   Pulse 82   Temp 98.7  F (37.1  C) (Temporal)   Resp 18   Ht 1.651 m (5' 5\")   Wt 86.6 kg (191 lb)   LMP  (LMP Unknown)   SpO2 99%   Breastfeeding No   BMI 31.78 kg/m    Body mass index is 31.78 kg/m .  Physical Exam   GENERAL: alert and no distress.  Speaking in full sentences.  Speech was easily comprehensible.  No problem with word articulation.  EYES: Eyes grossly normal to inspection, PERRL and conjunctivae and sclerae normal.  No nystagmus.  All 4 visual fields are intact.  HENT: ear canals and TM's normal.  Nares are non-congested. Oropharynx is pink and moist. No tender with palpation to the sinuses.   NECK: Supple, no lymphadenopathy or thyromegaly.  RESP: lungs clear to auscultation - no rales, rhonchi or wheezes  CV: regular rate and rhythm, no murmur  ABDOMEN: soft, nontender, nondistended no hepatosplenomegaly, no masses and bowel sounds normal  MS: no gross musculoskeletal defects noted, no edema.  Normal gait.  All 4 Strohman is a equal in strength.  NEURO: Normal strength and tone, mentation intact and speech normal.  Cranial nerves II to XII intact.  DTR +2 throughout.  PSYCH: mentation appears normal, affect normal/bright.  Thoughts are intact, no suicidal/homicidal ideation.  No hallucination.    Results for orders placed or performed in visit on 03/21/22   Comprehensive metabolic panel (BMP + Alb, Alk Phos, ALT, AST, Total. Bili, TP)     Status: Abnormal   Result Value Ref Range    Sodium 133 133 - 144 mmol/L    Potassium 4.4 3.4 - 5.3 mmol/L    Chloride 101 " 94 - 109 mmol/L    Carbon Dioxide (CO2) 27 20 - 32 mmol/L    Anion Gap 5 3 - 14 mmol/L    Urea Nitrogen 16 7 - 30 mg/dL    Creatinine 1.15 (H) 0.52 - 1.04 mg/dL    Calcium 9.4 8.5 - 10.1 mg/dL    Glucose 98 70 - 99 mg/dL    Alkaline Phosphatase 106 40 - 150 U/L    AST 29 0 - 45 U/L    ALT 28 0 - 50 U/L    Protein Total 7.3 6.8 - 8.8 g/dL    Albumin 3.7 3.4 - 5.0 g/dL    Bilirubin Total 0.8 0.2 - 1.3 mg/dL    GFR Estimate 51 (L) >60 mL/min/1.73m2   Lipid panel reflex to direct LDL Fasting     Status: None   Result Value Ref Range    Cholesterol 137 <200 mg/dL    Triglycerides 118 <150 mg/dL    Direct Measure HDL 62 >=50 mg/dL    LDL Cholesterol Calculated 51 <=100 mg/dL    Non HDL Cholesterol 75 <130 mg/dL    Patient Fasting > 8hrs? Yes     Narrative    Cholesterol  Desirable:  <200 mg/dL    Triglycerides  Normal:  Less than 150 mg/dL  Borderline High:  150-199 mg/dL  High:  200-499 mg/dL  Very High:  Greater than or equal to 500 mg/dL    Direct Measure HDL  Female:  Greater than or equal to 50 mg/dL   Male:  Greater than or equal to 40 mg/dL    LDL Cholesterol  Desirable:  <100mg/dL  Above Desirable:  100-129 mg/dL   Borderline High:  130-159 mg/dL   High:  160-189 mg/dL   Very High:  >= 190 mg/dL    Non HDL Cholesterol  Desirable:  130 mg/dL  Above Desirable:  130-159 mg/dL  Borderline High:  160-189 mg/dL  High:  190-219 mg/dL  Very High:  Greater than or equal to 220 mg/dL

## 2022-03-21 NOTE — LETTER
March 22, 2022      Tiara Smith  [address] [facility] [id number]        Dear ,    We are writing to inform you of your test results.    Creatinine level was slightly high which indicated her kidney function is slightly reduced but overall it is about the same as it was a year ago.  Electrolytes was normal.  Her liver enzyme was also normal.  No diabetes.  Her cholesterol looks pretty good.  No change in medication.       Vui.       Resulted Orders   Comprehensive metabolic panel (BMP + Alb, Alk Phos, ALT, AST, Total. Bili, TP)   Result Value Ref Range    Sodium 133 133 - 144 mmol/L    Potassium 4.4 3.4 - 5.3 mmol/L    Chloride 101 94 - 109 mmol/L    Carbon Dioxide (CO2) 27 20 - 32 mmol/L    Anion Gap 5 3 - 14 mmol/L    Urea Nitrogen 16 7 - 30 mg/dL    Creatinine 1.15 (H) 0.52 - 1.04 mg/dL    Calcium 9.4 8.5 - 10.1 mg/dL    Glucose 98 70 - 99 mg/dL    Alkaline Phosphatase 106 40 - 150 U/L    AST 29 0 - 45 U/L    ALT 28 0 - 50 U/L    Protein Total 7.3 6.8 - 8.8 g/dL    Albumin 3.7 3.4 - 5.0 g/dL    Bilirubin Total 0.8 0.2 - 1.3 mg/dL    GFR Estimate 51 (L) >60 mL/min/1.73m2      Comment:      Effective December 21, 2021 eGFRcr in adults is calculated using the 2021 CKD-EPI creatinine equation which includes age and gender (Chao delacruz al., NEJM, DOI: 10.1056/XGPUyb0943161)   Lipid panel reflex to direct LDL Fasting   Result Value Ref Range    Cholesterol 137 <200 mg/dL    Triglycerides 118 <150 mg/dL    Direct Measure HDL 62 >=50 mg/dL    LDL Cholesterol Calculated 51 <=100 mg/dL    Non HDL Cholesterol 75 <130 mg/dL    Patient Fasting > 8hrs? Yes     Narrative    Cholesterol  Desirable:  <200 mg/dL    Triglycerides  Normal:  Less than 150 mg/dL  Borderline High:  150-199 mg/dL  High:  200-499 mg/dL  Very High:  Greater than or equal to 500 mg/dL    Direct Measure HDL  Female:  Greater than or equal to 50 mg/dL   Male:  Greater than or equal to 40 mg/dL    LDL Cholesterol  Desirable:  <100mg/dL  Above  Desirable:  100-129 mg/dL   Borderline High:  130-159 mg/dL   High:  160-189 mg/dL   Very High:  >= 190 mg/dL    Non HDL Cholesterol  Desirable:  130 mg/dL  Above Desirable:  130-159 mg/dL  Borderline High:  160-189 mg/dL  High:  190-219 mg/dL  Very High:  Greater than or equal to 220 mg/dL       If you have any questions or concerns, please call the clinic at the number listed above.

## 2022-04-21 DIAGNOSIS — I63.519 CEREBROVASCULAR ACCIDENT (CVA) DUE TO OCCLUSION OF MIDDLE CEREBRAL ARTERY, UNSPECIFIED BLOOD VESSEL LATERALITY (H): ICD-10-CM

## 2022-04-21 RX ORDER — ATORVASTATIN CALCIUM 40 MG/1
40 TABLET, FILM COATED ORAL EVERY EVENING
Qty: 30 TABLET | Refills: 11 | Status: SHIPPED | OUTPATIENT
Start: 2022-04-21 | End: 2022-07-05

## 2022-05-31 DIAGNOSIS — I10 HTN, GOAL BELOW 130/80: ICD-10-CM

## 2022-05-31 DIAGNOSIS — I63.519 CEREBROVASCULAR ACCIDENT (CVA) DUE TO OCCLUSION OF MIDDLE CEREBRAL ARTERY, UNSPECIFIED BLOOD VESSEL LATERALITY (H): ICD-10-CM

## 2022-06-01 DIAGNOSIS — I10 HTN, GOAL BELOW 130/80: ICD-10-CM

## 2022-06-01 DIAGNOSIS — I63.519 CEREBROVASCULAR ACCIDENT (CVA) DUE TO OCCLUSION OF MIDDLE CEREBRAL ARTERY, UNSPECIFIED BLOOD VESSEL LATERALITY (H): ICD-10-CM

## 2022-06-03 NOTE — TELEPHONE ENCOUNTER
Routing refill request to provider for review/approval because:  Labs out of range:    Creatinine   Date Value Ref Range Status   03/21/2022 1.15 (H) 0.52 - 1.04 mg/dL Final   02/04/2021 1.04 0.52 - 1.04 mg/dL Final

## 2022-06-06 RX ORDER — LISINOPRIL 30 MG/1
30 TABLET ORAL DAILY
Qty: 90 TABLET | Refills: 0 | Status: SHIPPED | OUTPATIENT
Start: 2022-06-06 | End: 2022-09-06

## 2022-06-06 RX ORDER — HYDROCHLOROTHIAZIDE 25 MG/1
25 TABLET ORAL DAILY
Qty: 90 TABLET | Refills: 0 | Status: SHIPPED | OUTPATIENT
Start: 2022-06-06 | End: 2022-09-10

## 2022-06-06 NOTE — TELEPHONE ENCOUNTER
Pending Prescriptions:                       Disp   Refills    hydrochlorothiazide (HYDRODIURIL) 25 MG ta*90 tab*0        Sig: TAKE 1 TABLET (25 MG) BY MOUTH DAILY    Routing refill request to provider for review/approval because:  Labs out of range:  GABRIELA Barnes RN

## 2022-06-06 NOTE — TELEPHONE ENCOUNTER
Pending Prescriptions:                       Disp   Refills    lisinopril (ZESTRIL) 30 MG tablet [Pharmac*90 tab*0        Sig: TAKE 1 TABLET (30 MG) BY MOUTH DAILY    Routing refill request to provider for review/approval because:  Labs out of range:  GABRIELA Barnes RN

## 2022-07-05 ENCOUNTER — OFFICE VISIT (OUTPATIENT)
Dept: INTERNAL MEDICINE | Facility: CLINIC | Age: 72
End: 2022-07-05
Payer: COMMERCIAL

## 2022-07-05 VITALS
DIASTOLIC BLOOD PRESSURE: 86 MMHG | RESPIRATION RATE: 18 BRPM | SYSTOLIC BLOOD PRESSURE: 134 MMHG | OXYGEN SATURATION: 95 % | HEART RATE: 76 BPM | BODY MASS INDEX: 31.78 KG/M2 | TEMPERATURE: 97.1 F | WEIGHT: 191 LBS

## 2022-07-05 DIAGNOSIS — E78.5 HYPERLIPIDEMIA LDL GOAL <130: ICD-10-CM

## 2022-07-05 DIAGNOSIS — I10 BENIGN ESSENTIAL HYPERTENSION: ICD-10-CM

## 2022-07-05 DIAGNOSIS — M85.89 OSTEOPENIA OF MULTIPLE SITES: Primary | ICD-10-CM

## 2022-07-05 DIAGNOSIS — I63.519 CEREBROVASCULAR ACCIDENT (CVA) DUE TO OCCLUSION OF MIDDLE CEREBRAL ARTERY, UNSPECIFIED BLOOD VESSEL LATERALITY (H): ICD-10-CM

## 2022-07-05 DIAGNOSIS — Z12.31 VISIT FOR SCREENING MAMMOGRAM: ICD-10-CM

## 2022-07-05 DIAGNOSIS — Z12.11 SCREEN FOR COLON CANCER: ICD-10-CM

## 2022-07-05 PROCEDURE — 99397 PER PM REEVAL EST PAT 65+ YR: CPT | Performed by: INTERNAL MEDICINE

## 2022-07-05 PROCEDURE — 99213 OFFICE O/P EST LOW 20 MIN: CPT | Mod: 25 | Performed by: INTERNAL MEDICINE

## 2022-07-05 RX ORDER — ATORVASTATIN CALCIUM 40 MG/1
40 TABLET, FILM COATED ORAL EVERY EVENING
Qty: 90 TABLET | Refills: 3 | Status: SHIPPED | OUTPATIENT
Start: 2022-07-05 | End: 2023-06-08

## 2022-07-05 ASSESSMENT — PAIN SCALES - GENERAL: PAINLEVEL: NO PAIN (0)

## 2022-07-05 ASSESSMENT — ACTIVITIES OF DAILY LIVING (ADL): CURRENT_FUNCTION: TRANSPORTATION REQUIRES ASSISTANCE

## 2022-07-05 NOTE — PROGRESS NOTES
"SUBJECTIVE:   Tiara Smith is a 71 year old female who presents for Preventive Visit.    Patient has been advised of split billing requirements and indicates understanding: Yes  Are you in the first 12 months of your Medicare coverage?  No    Healthy Habits:    In general, how would you rate your overall health?  Fair    Frequency of exercise:  None    Duration of exercise:  Other    Do you usually eat at least 4 servings of fruit and vegetables a day, include whole grains    & fiber and avoid regularly eating high fat or \"junk\" foods?  No    Taking medications regularly:  Yes    Barriers to taking medications:  None    Medication side effects:  None    Ability to successfully perform activities of daily living:  Transportation requires assistance    Home Safety:  No safety concerns identified    Hearing Impairment:  Difficulty understanding soft or whispered speech    In the past 6 months, have you been bothered by leaking of urine? Yes    In general, how would you rate your overall mental or emotional health?  Good      PHQ-2 Total Score:    Additional concerns today:  No    Do you feel safe in your environment? Yes    Have you ever done Advance Care Planning? (For example, a Health Directive, POLST, or a discussion with a medical provider or your loved ones about your wishes): No, advance care planning information given to patient to review.  Patient plans to discuss their wishes with loved ones or provider.         Fall risk  Fallen 2 or more times in the past year?: No  Any fall with injury in the past year?: No    Cognitive Screening   1) Repeat 3 items (Leader, Season, Table)    2) Clock draw: NORMAL  3) 3 item recall: Recalls 2 objects   Results: NORMAL clock, 1-2 items recalled: COGNITIVE IMPAIRMENT LESS LIKELY    Mini-CogTM Copyright RASHEEDA Pichardo. Licensed by the author for use in [facility]; reprinted with permission (shahram@.[email or web address]). All rights reserved.      Do you have sleep apnea, " excessive snoring or daytime drowsiness?: no    Reviewed and updated as needed this visit by clinical staff   Tobacco  Allergies  Meds   Med Hx  Surg Hx  Fam Hx  Soc Hx          Reviewed and updated as needed this visit by Provider                   Social History     Tobacco Use     Smoking status: Current Every Day Smoker     Packs/day: 0.50     Years: 30.00     Pack years: 15.00     Types: Cigarettes     Smokeless tobacco: Never Used     Tobacco comment: Agreed to NRT while inpatient and at discharge   Substance Use Topics     Alcohol use: No     Comment: occasional               Patient just wants her Lipitor prescription refilled.  Lipid and liver results were noted 3 months ago and were within normal limits.    Current providers sharing in care for this patient include:   Patient Care Team:  Deo Arevalo MD as PCP - General (Family Medicine)  Deo Arevalo MD as Assigned PCP    The following health maintenance items are reviewed in Epic and correct as of today:  Health Maintenance Due   Topic Date Due     DEXA  Never done     ZOSTER IMMUNIZATION (1 of 2) Never done     MAMMO SCREENING  03/17/2007     LUNG CANCER SCREENING  11/03/2021     Pneumococcal Vaccine: 65+ Years (2 - PCV) 11/24/2021     COLORECTAL CANCER SCREENING  02/26/2022     COVID-19 Vaccine (4 - Booster for Pfizer series) 03/24/2022     Labs reviewed in EPIC  BP Readings from Last 3 Encounters:   07/05/22 134/86   03/21/22 128/70   08/26/21 124/82    Wt Readings from Last 3 Encounters:   07/05/22 86.6 kg (191 lb)   03/21/22 86.6 kg (191 lb)   06/13/21 92.3 kg (203 lb 8 oz)                  Patient Active Problem List   Diagnosis     HTN, goal below 130/80     Hyperlipidemia LDL goal <130     Advanced directives, counseling/discussion     CVA (cerebral vascular accident) (H)     Past Surgical History:   Procedure Laterality Date     HC EXCISION BREAST LESION W XRAY MARKER, OPEN SINGLE  3/25/2005    Left breast.     ZZHC COLONOSCOPY W/WO  BRUSH/WASH  3/29/2005       Social History     Tobacco Use     Smoking status: Current Every Day Smoker     Packs/day: 0.50     Years: 30.00     Pack years: 15.00     Types: Cigarettes     Smokeless tobacco: Never Used     Tobacco comment: Agreed to NRT while inpatient and at discharge   Substance Use Topics     Alcohol use: No     Comment: occasional     Family History   Problem Relation Age of Onset     Osteoporosis Mother      Neurologic Disorder Mother      Genitourinary Problems Mother      Depression Mother      Eye Disorder Mother      Hypertension Father      Cerebrovascular Disease Father      Alcohol/Drug Father      Allergies Father      Cancer Father         lung cancer - smoker     Lipids Father      Arthritis Father      Coronary Artery Disease Father      Cancer Maternal Grandfather         melanoma     No Known Problems Son      Gynecology Daughter      No Known Problems Sister      No Known Problems Son      No Known Problems Son          Current Outpatient Medications   Medication Sig Dispense Refill     aspirin (ASA) 81 MG chewable tablet Take 1 tablet (81 mg) by mouth daily 30 tablet 1     atorvastatin (LIPITOR) 40 MG tablet Take 1 tablet (40 mg) by mouth every evening 90 tablet 3     hydrochlorothiazide (HYDRODIURIL) 25 MG tablet TAKE 1 TABLET (25 MG) BY MOUTH DAILY 90 tablet 0     lisinopril (ZESTRIL) 30 MG tablet TAKE 1 TABLET (30 MG) BY MOUTH DAILY 90 tablet 0     Allergies   Allergen Reactions     Codeine      thick tongue, diff. speaking to UPJOHN #3     Patient is refusing mammogram, all vaccinations.        Review of Systems  CONSTITUTIONAL: NEGATIVE for fever, chills, change in weight  INTEGUMENTARY/SKIN: NEGATIVE for worrisome rashes, moles or lesions  EYES: NEGATIVE for vision changes or irritation  ENT/MOUTH: NEGATIVE for ear, mouth and throat problems  RESP: NEGATIVE for significant cough or SOB  CV: NEGATIVE for chest pain, palpitations or peripheral edema  GI: NEGATIVE for nausea,  "abdominal pain, heartburn, or change in bowel habits  : NEGATIVE for frequency, dysuria, or hematuria  MUSCULOSKELETAL: NEGATIVE for significant arthralgias or myalgia  NEURO: NEGATIVE for weakness, dizziness or paresthesias  ENDOCRINE: NEGATIVE for temperature intolerance, skin/hair changes  HEME: NEGATIVE for bleeding problems  PSYCHIATRIC: NEGATIVE for changes in mood or affect    OBJECTIVE:   /86 (BP Location: Right arm, Patient Position: Sitting, Cuff Size: Adult Large)   Pulse 76   Temp 97.1  F (36.2  C) (Temporal)   Resp 18   Wt 86.6 kg (191 lb)   LMP  (LMP Unknown)   SpO2 95%   BMI 31.78 kg/m   Estimated body mass index is 31.78 kg/m  as calculated from the following:    Height as of 3/21/22: 1.651 m (5' 5\").    Weight as of this encounter: 86.6 kg (191 lb).  Physical Exam  GENERAL APPEARANCE: healthy, alert and no distress  EYES: Eyes grossly normal to inspection, PERRL and conjunctivae and sclerae normal  HENT: ear canals and TM's normal, nose and mouth without ulcers or lesions, oropharynx clear and oral mucous membranes moist  NECK: no adenopathy, no asymmetry, masses, or scars and thyroid normal to palpation  RESP: lungs clear to auscultation - no rales, rhonchi or wheezes  CV: regular rate and rhythm, normal S1 S2, no S3 or S4, no murmur, click or rub, no peripheral edema and peripheral pulses strong  ABDOMEN: soft, nontender, no hepatosplenomegaly, no masses and bowel sounds normal  MS: no musculoskeletal defects are noted and gait is age appropriate without ataxia  SKIN: no suspicious lesions or rashes  NEURO: Normal strength and tone, sensory exam grossly normal, mentation intact and speech normal  PSYCH: mentation appears normal and affect normal/bright    Diagnostic Test Results:  Labs reviewed in Epic    ASSESSMENT / PLAN:       ICD-10-CM    1. Osteopenia of multiple sites  M85.89 DEXA HIP/PELVIS/SPINE - Future   2. Benign essential hypertension  I10    3. Hyperlipidemia LDL goal " "<130  E78.5    4. Cerebrovascular accident (CVA) due to occlusion of middle cerebral artery, unspecified blood vessel laterality (H)  I63.519 atorvastatin (LIPITOR) 40 MG tablet   5. Visit for screening mammogram  Z12.31    6. Screen for colon cancer  Z12.11 Fecal colorectal cancer screen FIT - Future (S+30)     Fecal colorectal cancer screen FIT - Future (S+30)       Patient has been advised of split billing requirements and indicates understanding: Yes    COUNSELING:  Reviewed preventive health counseling, as reflected in patient instructions       Regular exercise       Healthy diet/nutrition       Vision screening       Hearing screening       Dental care       Osteoporosis prevention/bone health    Estimated body mass index is 31.78 kg/m  as calculated from the following:    Height as of 3/21/22: 1.651 m (5' 5\").    Weight as of this encounter: 86.6 kg (191 lb).        She reports that she has been smoking cigarettes. She has a 15.00 pack-year smoking history. She has never used smokeless tobacco.      Appropriate preventive services were discussed with this patient, including applicable screening as appropriate for cardiovascular disease, diabetes, osteopenia/osteoporosis, and glaucoma.  As appropriate for age/gender, discussed screening for colorectal cancer, prostate cancer, breast cancer, and cervical cancer. Checklist reviewing preventive services available has been given to the patient.    Reviewed patients plan of care and provided an AVS. The Basic Care Plan (routine screening as documented in Health Maintenance) for Tiara meets the Care Plan requirement. This Care Plan has been established and reviewed with the Patient and spouse.    Counseling Resources:  ATP IV Guidelines  Pooled Cohorts Equation Calculator  Breast Cancer Risk Calculator  Breast Cancer: Medication to Reduce Risk  FRAX Risk Assessment  ICSI Preventive Guidelines  Dietary Guidelines for Americans, 2010  USDA's MyPlate  ASA " Prophylaxis  Lung CA Screening    Jared Akhtar DO  [facility]    Identified Health Risks:

## 2022-07-14 ENCOUNTER — HOSPITAL ENCOUNTER (OUTPATIENT)
Dept: BONE DENSITY | Facility: CLINIC | Age: 72
Discharge: HOME | End: 2022-07-14
Attending: INTERNAL MEDICINE | Admitting: INTERNAL MEDICINE
Payer: COMMERCIAL

## 2022-07-14 DIAGNOSIS — M85.89 OSTEOPENIA OF MULTIPLE SITES: ICD-10-CM

## 2022-07-14 PROCEDURE — 77080 DXA BONE DENSITY AXIAL: CPT

## 2022-07-20 PROCEDURE — 82274 ASSAY TEST FOR BLOOD FECAL: CPT | Performed by: INTERNAL MEDICINE

## 2022-07-22 LAB — HEMOCCULT STL QL IA: NEGATIVE

## 2022-09-01 DIAGNOSIS — I63.519 CEREBROVASCULAR ACCIDENT (CVA) DUE TO OCCLUSION OF MIDDLE CEREBRAL ARTERY, UNSPECIFIED BLOOD VESSEL LATERALITY (H): ICD-10-CM

## 2022-09-01 DIAGNOSIS — I10 HTN, GOAL BELOW 130/80: ICD-10-CM

## 2022-09-06 RX ORDER — LISINOPRIL 30 MG/1
30 TABLET ORAL DAILY
Qty: 90 TABLET | Refills: 0 | Status: SHIPPED | OUTPATIENT
Start: 2022-09-06 | End: 2022-12-07

## 2022-09-06 NOTE — TELEPHONE ENCOUNTER
Pending Prescriptions:                       Disp   Refills    lisinopril (ZESTRIL) 30 MG tablet [Pharmac*90 tab*0        Sig: TAKE 1 TABLET (30 MG) BY MOUTH DAILY      Routing refill request to provider for review/approval because:  Labs out of range:    Creatinine   Date Value Ref Range Status   03/21/2022 1.15 (H) 0.52 - 1.04 mg/dL Final   02/04/2021 1.04 0.52 - 1.04 mg/dL Final         Paula Gaviria RN

## 2022-09-07 DIAGNOSIS — I63.519 CEREBROVASCULAR ACCIDENT (CVA) DUE TO OCCLUSION OF MIDDLE CEREBRAL ARTERY, UNSPECIFIED BLOOD VESSEL LATERALITY (H): ICD-10-CM

## 2022-09-07 DIAGNOSIS — I10 HTN, GOAL BELOW 130/80: ICD-10-CM

## 2022-09-09 NOTE — TELEPHONE ENCOUNTER
Pending Prescriptions:                       Disp   Refills    hydrochlorothiazide (HYDRODIURIL) 25 MG ta*90 tab*0        Sig: TAKE 1 TABLET (25 MG) BY MOUTH DAILY      Routing refill request to provider for review/approval because:  Labs out of range:    Creatinine   Date Value Ref Range Status   03/21/2022 1.15 (H) 0.52 - 1.04 mg/dL Final   02/04/2021 1.04 0.52 - 1.04 mg/dL Final   '      Paula Gaviria RN

## 2022-09-10 RX ORDER — HYDROCHLOROTHIAZIDE 25 MG/1
25 TABLET ORAL DAILY
Qty: 90 TABLET | Refills: 0 | Status: SHIPPED | OUTPATIENT
Start: 2022-09-10 | End: 2022-12-08

## 2022-09-16 NOTE — TELEPHONE ENCOUNTER
Routing to provider to sign orders for BMP. Then route back to [facility] for patient to be contacted.   Gricelda Larson MA

## 2022-09-20 NOTE — TELEPHONE ENCOUNTER
Spoke with patient and informed of note below. Appointment made.   Closing encounter    Gricelda Larson MA

## 2022-09-22 ENCOUNTER — LAB (OUTPATIENT)
Dept: LAB | Facility: CLINIC | Age: 72
End: 2022-09-22
Payer: COMMERCIAL

## 2022-09-22 DIAGNOSIS — I10 HTN, GOAL BELOW 130/80: ICD-10-CM

## 2022-09-22 DIAGNOSIS — I63.519 CEREBROVASCULAR ACCIDENT (CVA) DUE TO OCCLUSION OF MIDDLE CEREBRAL ARTERY, UNSPECIFIED BLOOD VESSEL LATERALITY (H): ICD-10-CM

## 2022-09-22 LAB
ALBUMIN SERPL-MCNC: 3.5 G/DL (ref 3.4–5)
ALP SERPL-CCNC: 123 U/L (ref 40–150)
ALT SERPL W P-5'-P-CCNC: 32 U/L (ref 0–50)
ANION GAP SERPL CALCULATED.3IONS-SCNC: 5 MMOL/L (ref 3–14)
AST SERPL W P-5'-P-CCNC: 35 U/L (ref 0–45)
BILIRUB SERPL-MCNC: 0.8 MG/DL (ref 0.2–1.3)
BUN SERPL-MCNC: 15 MG/DL (ref 7–30)
CALCIUM SERPL-MCNC: 9 MG/DL (ref 8.5–10.1)
CHLORIDE BLD-SCNC: 97 MMOL/L (ref 94–109)
CO2 SERPL-SCNC: 26 MMOL/L (ref 20–32)
CREAT SERPL-MCNC: 0.87 MG/DL (ref 0.52–1.04)
GFR SERPL CREATININE-BSD FRML MDRD: 71 ML/MIN/1.73M2
GLUCOSE BLD-MCNC: 106 MG/DL (ref 70–99)
POTASSIUM BLD-SCNC: 4 MMOL/L (ref 3.4–5.3)
PROT SERPL-MCNC: 7 G/DL (ref 6.8–8.8)
SODIUM SERPL-SCNC: 128 MMOL/L (ref 133–144)

## 2022-09-22 PROCEDURE — 80053 COMPREHEN METABOLIC PANEL: CPT

## 2022-09-22 PROCEDURE — 36415 COLL VENOUS BLD VENIPUNCTURE: CPT

## 2022-12-06 DIAGNOSIS — I63.519 CEREBROVASCULAR ACCIDENT (CVA) DUE TO OCCLUSION OF MIDDLE CEREBRAL ARTERY, UNSPECIFIED BLOOD VESSEL LATERALITY (H): ICD-10-CM

## 2022-12-06 DIAGNOSIS — I10 HTN, GOAL BELOW 130/80: ICD-10-CM

## 2022-12-07 DIAGNOSIS — I63.519 CEREBROVASCULAR ACCIDENT (CVA) DUE TO OCCLUSION OF MIDDLE CEREBRAL ARTERY, UNSPECIFIED BLOOD VESSEL LATERALITY (H): ICD-10-CM

## 2022-12-07 DIAGNOSIS — I10 HTN, GOAL BELOW 130/80: ICD-10-CM

## 2022-12-07 RX ORDER — LISINOPRIL 30 MG/1
30 TABLET ORAL DAILY
Qty: 90 TABLET | Refills: 0 | Status: SHIPPED | OUTPATIENT
Start: 2022-12-07 | End: 2023-03-01

## 2022-12-07 NOTE — TELEPHONE ENCOUNTER
Prescription approved per [facility] Refill Protocol.  Paula Gaviria RN

## 2022-12-08 RX ORDER — HYDROCHLOROTHIAZIDE 25 MG/1
25 TABLET ORAL DAILY
Qty: 90 TABLET | Refills: 0 | Status: SHIPPED | OUTPATIENT
Start: 2022-12-08 | End: 2023-03-09

## 2022-12-08 NOTE — TELEPHONE ENCOUNTER
"Requested Prescriptions   Pending Prescriptions Disp Refills    hydrochlorothiazide (HYDRODIURIL) 25 MG tablet [Pharmacy Med Name: HYDROCHLOROTHIAZIDE 25MG TAB] 90 tablet 0     Sig: TAKE 1 TABLET (25 MG) BY MOUTH DAILY       Diuretics (Including Combos) Protocol Failed - 12/7/2022 10:14 AM        Failed - Normal serum sodium on file in past 12 months     Recent Labs   Lab Test 09/22/22  1359   *              Passed - Blood pressure under 140/90 in past 12 months     BP Readings from Last 3 Encounters:   07/05/22 134/86   03/21/22 128/70   08/26/21 124/82                 Passed - Recent (12 mo) or future (30 days) visit within the authorizing provider's specialty     Patient has had an office visit with the authorizing provider or a provider within the authorizing providers department within the previous 12 mos or has a future within next 30 days. See \"Patient Info\" tab in inbasket, or \"Choose Columns\" in Meds & Orders section of the refill encounter.              Passed - Medication is active on med list        Passed - Patient is age 18 or older        Passed - No active pregancy on record        Passed - Normal serum creatinine on file in past 12 months     Recent Labs   Lab Test 09/22/22  1359   CR 0.87              Passed - Normal serum potassium on file in past 12 months     Recent Labs   Lab Test 09/22/22  1359   POTASSIUM 4.0                    Passed - No positive pregnancy test in past 12 months             "

## 2023-01-20 ENCOUNTER — OFFICE VISIT (OUTPATIENT)
Dept: FAMILY MEDICINE | Facility: CLINIC | Age: 73
End: 2023-01-20
Payer: COMMERCIAL

## 2023-01-20 VITALS
TEMPERATURE: 97.9 F | WEIGHT: 192.4 LBS | DIASTOLIC BLOOD PRESSURE: 80 MMHG | RESPIRATION RATE: 16 BRPM | OXYGEN SATURATION: 97 % | SYSTOLIC BLOOD PRESSURE: 118 MMHG | BODY MASS INDEX: 32.02 KG/M2 | HEART RATE: 60 BPM

## 2023-01-20 DIAGNOSIS — E87.1 HYPONATREMIA: ICD-10-CM

## 2023-01-20 DIAGNOSIS — R29.6 FALLS FREQUENTLY: Primary | ICD-10-CM

## 2023-01-20 DIAGNOSIS — R79.89 ELEVATED LFTS: ICD-10-CM

## 2023-01-20 DIAGNOSIS — R94.31 NONSPECIFIC ABNORMAL ELECTROCARDIOGRAM (ECG) (EKG): ICD-10-CM

## 2023-01-20 DIAGNOSIS — I10 HTN, GOAL BELOW 130/80: ICD-10-CM

## 2023-01-20 DIAGNOSIS — E78.5 HYPERLIPIDEMIA LDL GOAL <70: ICD-10-CM

## 2023-01-20 DIAGNOSIS — F17.200 TOBACCO USE DISORDER: ICD-10-CM

## 2023-01-20 DIAGNOSIS — I63.519 CEREBROVASCULAR ACCIDENT (CVA) DUE TO OCCLUSION OF MIDDLE CEREBRAL ARTERY, UNSPECIFIED BLOOD VESSEL LATERALITY (H): ICD-10-CM

## 2023-01-20 LAB
ALBUMIN SERPL BCG-MCNC: 4 G/DL (ref 3.5–5.2)
ALP SERPL-CCNC: 138 U/L (ref 35–104)
ALT SERPL W P-5'-P-CCNC: 42 U/L (ref 10–35)
ANION GAP SERPL CALCULATED.3IONS-SCNC: 11 MMOL/L (ref 7–15)
AST SERPL W P-5'-P-CCNC: 44 U/L (ref 10–35)
BILIRUB SERPL-MCNC: 0.5 MG/DL
BUN SERPL-MCNC: 21.6 MG/DL (ref 8–23)
CALCIUM SERPL-MCNC: 8.9 MG/DL (ref 8.8–10.2)
CHLORIDE SERPL-SCNC: 98 MMOL/L (ref 98–107)
CHOLEST SERPL-MCNC: 145 MG/DL
CREAT SERPL-MCNC: 1.05 MG/DL (ref 0.51–0.95)
DEPRECATED HCO3 PLAS-SCNC: 24 MMOL/L (ref 22–29)
ERYTHROCYTE [DISTWIDTH] IN BLOOD BY AUTOMATED COUNT: 14.5 % (ref 10–15)
GFR SERPL CREATININE-BSD FRML MDRD: 56 ML/MIN/1.73M2
GLUCOSE SERPL-MCNC: 96 MG/DL (ref 70–99)
HCT VFR BLD AUTO: 40.8 % (ref 35–47)
HDLC SERPL-MCNC: 63 MG/DL
HGB BLD-MCNC: 13.3 G/DL (ref 11.7–15.7)
LDLC SERPL CALC-MCNC: 65 MG/DL
MCH RBC QN AUTO: 29.2 PG (ref 26.5–33)
MCHC RBC AUTO-ENTMCNC: 32.6 G/DL (ref 31.5–36.5)
MCV RBC AUTO: 90 FL (ref 78–100)
NONHDLC SERPL-MCNC: 82 MG/DL
PLATELET # BLD AUTO: 272 10E3/UL (ref 150–450)
POTASSIUM SERPL-SCNC: 4.4 MMOL/L (ref 3.4–5.3)
PROT SERPL-MCNC: 6.8 G/DL (ref 6.4–8.3)
RBC # BLD AUTO: 4.55 10E6/UL (ref 3.8–5.2)
SODIUM SERPL-SCNC: 133 MMOL/L (ref 136–145)
TRIGL SERPL-MCNC: 87 MG/DL
TSH SERPL DL<=0.005 MIU/L-ACNC: 1.7 UIU/ML (ref 0.3–4.2)
WBC # BLD AUTO: 5.9 10E3/UL (ref 4–11)

## 2023-01-20 PROCEDURE — 93000 ELECTROCARDIOGRAM COMPLETE: CPT | Performed by: FAMILY MEDICINE

## 2023-01-20 PROCEDURE — 80053 COMPREHEN METABOLIC PANEL: CPT | Performed by: FAMILY MEDICINE

## 2023-01-20 PROCEDURE — 99214 OFFICE O/P EST MOD 30 MIN: CPT | Performed by: FAMILY MEDICINE

## 2023-01-20 PROCEDURE — 84443 ASSAY THYROID STIM HORMONE: CPT | Performed by: FAMILY MEDICINE

## 2023-01-20 PROCEDURE — 36415 COLL VENOUS BLD VENIPUNCTURE: CPT | Performed by: FAMILY MEDICINE

## 2023-01-20 PROCEDURE — 80061 LIPID PANEL: CPT | Performed by: FAMILY MEDICINE

## 2023-01-20 PROCEDURE — 85027 COMPLETE CBC AUTOMATED: CPT | Performed by: FAMILY MEDICINE

## 2023-01-20 ASSESSMENT — PAIN SCALES - GENERAL: PAINLEVEL: NO PAIN (0)

## 2023-01-20 NOTE — Clinical Note
The patient know that it also referred her to cardiology due to stable but abnormal EKG and her last ultrasound of the heart couple years ago last he was in the hospital was also abnormal.

## 2023-01-20 NOTE — PROGRESS NOTES
Assessment & Plan     (R29.6) Falls frequently (primary encounter diagnosis)  Comment: Fell twice in the last month when she was outside on the icy and uneven surface.  Family members also noted she she has been more unstable than usual.  She had a stroke couple years ago which has affected her gait with good recovery.  She has a cane at home but has not used it.  EKG today shows sinus rhythm with left atrial enlargement, frequent ectopic ventricular beats and poor R wave progression, unchanged from 2020's EKG.  An echocardiogram in 2020 showed normal left ventricular size, moderate concentric wall thickening consistent with left ventricular hypertrophy and early diastolic dysfunction.  She denies of chest pain, shortness of breath, or lightheadedness before the fall.    On physical exam today was normal, no focal neurological deficit.     I had a long conversation with her about fall prevention.  Emphasized importance of making every effort lifting her feet up when she is walking.  Also referral to physical therapy for further evaluation.  Encouraged to use the cane for now until after being evaluated by the physical therapist.    Labs as ordered - CBC and TSH were normal.  CMP was also normal except for slightly elevated liver enzyme.    Will also fill out the [email or web address] application on her behalf.    Plan: CBC with platelets, EKG 12-lead complete w/read        - Clinics, Physical Therapy Referral    (R 94.31) -nonspecific abnormal EKG  Chart review, she has never seen a cardiologist.  Although her EKG is stable but it is overall abnormal.  Echocardiogram in 2022 showed left ventricular hypertrophy.  She also has high blood pressure, high cholesterol and with her history of stroke, will refer her to cardiology for reevaluation    Plan: Cardiology referral    (I10) HTN, goal below 130/80     Comment: Also has high cholesterol and history of stroke.  The goal for her blood pressure to be less than 130/80.   Tolerating the hydrochlorothiazide and lisinopril well.  May consider switching the hydrochlorothiazide to a beta-blocker due to left ventricular hypertrophy on the echocardiogram if it is okay with the cardiologist. Blood pressure was normal today and has been stable - will continue with the lisinopril and hydrochlorothiazide for now.  Strongly encouraged her to stay away from high salt.  Emphasized on exercising, healthy diet and weight loss.  Labs as ordered -  kidney function was slightly low but overall stable.  Electrolytes were normal except of low potassium - likely due to the hydrochlorothiazide.  Will recheck the CMP in 2 months, if the sodium remains low, will consider switching the hydrochlorothiazide to a beta-blocker.    Plan: Comprehensive metabolic panel (BMP + Alb, Alk         Phos, ALT, AST, Total. Bili, TP)            (E78.5) Hyperlipidemia LDL goal <70    Comment: Also has high blood pressure and history of stroke.  The goal for LDL to be less than 70.  Tolerating the Lipitor well.  No history of liver disease.  Her liver enzymes today were minimally elevated - will keep an eye on it for now - recheck the AST/ALT in 2-3 months.  No excessive alcohol intake.  Cholesterol level today was again therapeutic.  Will continue with the Lipitor at 40 mg daily.  Emphasized on exercising, healthy diet and weight management.  Recheck the cholesterol level in a year.    Plan: Comprehensive metabolic panel (BMP + Alb, Alk         Phos, ALT, AST, Total. Bili, TP), Lipid panel         reflex to direct LDL Fasting, TSH with free T4         reflex            (I63.519) Cerebrovascular accident (CVA) due to occlusion of middle cerebral artery, unspecified blood vessel laterality (H)  Comment: Stable and has been doing well.  Both cholesterol and high blood pressure are well controlled.  quitted smoking for couple years.  No drugs.  She is obese with a BMI of 32, encouraged to work on weight loss.  Will continue with  "the statin and aspirin for life.  Symptoms that need to be seen or call in discussed.    Plan: Lipid panel reflex to direct LDL Fasting             I again, emphasized on the importance of preventive care.  She declined mammogram or DEXA scan.  Also declined all vaccination, including influenza, COVID booster, pneumonia and shingle vaccination.  She also declined lung cancer screening.    Nicotine/Tobacco Cessation:  She reports that she has been smoking cigarettes. She has a 15.00 pack-year smoking history. She has never used smokeless tobacco.  Nicotine/Tobacco Cessation Plan:   Information offered: Patient not interested at this time      BMI:   Estimated body mass index is 32.02 kg/m  as calculated from the following:    Height as of 3/21/22: 1.651 m (5' 5\").    Weight as of this encounter: 87.3 kg (192 lb 6.4 oz).   Weight management plan: Discussed healthy diet and exercise guidelines        Return in about 3 months (around 4/20/2023) for Physical Exam, folow up general follow-up.    Deo Love Mai, MD  [facility]   Tiara is a 72 year old accompanied by her daughter, presenting for the following health issues:  Walking getting worse since stroke      History of Present Illness       Reason for visit:  Not beinging able to walk or get up from falls  Symptom onset:  More than a month  Symptom intensity:  Severe  Symptom progression:  Staying the same  Had these symptoms before:  No    She eats 2-3 servings of fruits and vegetables daily.She consumes 0 sweetened beverage(s) daily.She exercises with enough effort to increase her heart rate 9 or less minutes per day.  She exercises with enough effort to increase her heart rate 3 or less days per week.   She is taking medications regularly.     Tiara has a complex medical history which include history of stroke, high blood pressure and high cholesterol.  She was brought in today because of the frequent falling in the last " month.  According to the patient, she fell twice so far this month, both of which happened outside on an icy and even surface.  Did not hit her head or pass out.  The first episode, she fell on the snow bank, was not able to get up on her own.  The second time happened last week, she was able to get up on her own.  No pain after that.  Stating that she thought it was due to icy and uneven surface.  No lightheadedness, dizziness or weakness prior to the fall.  Did not feel like her leg was giving out on her.  Family also noted of unstable gait at times.  No falling inside the house.  States that she has been taking the medications as prescribed.  She has not seen the neurologist for years, in fact never seen one since her hospitalization for the stroke in 2020; not interested to see one.  She takes aspirin, Lipitor, hydrochlorothiazide and lisinopril.  No chest pain or shortness of breath.  No headache or dizziness.  No nausea, vomiting, diarrhea or constipation.  No orthopnea or dyspnea.  No other concern today.  Still not interested on any form of preventive care.  Declines on all vaccinations as well.  No smoking.  No claudication.  No other concern.    Review of Systems   Constitutional, HEENT, cardiovascular, pulmonary, gi and gu systems are negative, except as otherwise noted.      Objective    /80   Pulse 60   Temp 97.9  F (36.6  C) (Temporal)   Resp 16   Wt 87.3 kg (192 lb 6.4 oz)   LMP  (LMP Unknown)   SpO2 97%   BMI 32.02 kg/m    There is no height or weight on file to calculate BMI.  Physical Exam   GENERAL: alert and no distress; answering questions appropriate.  Her speech was easily comprehended.  EYES: Eyes grossly normal to inspection, PERRL and conjunctivae and sclerae normal no nystagmus.  HENT: ear canals and TM's normal.  Nares are non-congested. Oropharynx is pink and moist. No tender with palpation to the sinuses.   NECK: Supple, no lymphadenopathy or thyromegaly.  No JV distention  or carotid bruits.  RESP: lungs clear to auscultation - no rales, rhonchi or wheezes  CV: regular rate and rhythm, normal S1 S2, no murmur, click or rub, no peripheral edema and peripheral pulses strong  ABDOMEN: soft, nontender, no hepatosplenomegaly, no masses and bowel sounds normal  MS: no gross musculoskeletal defects noted, no edema.  No focal weakness.  Normal gait.  SKIN: no suspicious lesions or rashes.  No ecchymosis or petechiae.  NEURO: Normal strength and tone, mentation intact and speech normal.  No focal neurological deficit.  PSYCH: mentation appears normal, affect normal/bright    Results for orders placed or performed in visit on 01/20/23   TSH with free T4 reflex     Status: Normal   Result Value Ref Range    TSH 1.70 0.30 - 4.20 uIU/mL   CBC with platelets     Status: Normal   Result Value Ref Range    WBC Count 5.9 4.0 - 11.0 10e3/uL    RBC Count 4.55 3.80 - 5.20 10e6/uL    Hemoglobin 13.3 11.7 - 15.7 g/dL    Hematocrit 40.8 35.0 - 47.0 %    MCV 90 78 - 100 fL    MCH 29.2 26.5 - 33.0 pg    MCHC 32.6 31.5 - 36.5 g/dL    RDW 14.5 10.0 - 15.0 %    Platelet Count 272 150 - 450 10e3/uL   Lipid panel reflex to direct LDL Fasting     Status: Normal   Result Value Ref Range    Cholesterol 145 <200 mg/dL    Triglycerides 87 <150 mg/dL    Direct Measure HDL 63 >=50 mg/dL    LDL Cholesterol Calculated 65 <=100 mg/dL    Non HDL Cholesterol 82 <130 mg/dL    Narrative    Cholesterol  Desirable:  <200 mg/dL    Triglycerides  Normal:  Less than 150 mg/dL  Borderline High:  150-199 mg/dL  High:  200-499 mg/dL  Very High:  Greater than or equal to 500 mg/dL    Direct Measure HDL  Female:  Greater than or equal to 50 mg/dL   Male:  Greater than or equal to 40 mg/dL    LDL Cholesterol  Desirable:  <100mg/dL  Above Desirable:  100-129 mg/dL   Borderline High:  130-159 mg/dL   High:  160-189 mg/dL   Very High:  >= 190 mg/dL    Non HDL Cholesterol  Desirable:  130 mg/dL  Above Desirable:  130-159 mg/dL  Borderline  High:  160-189 mg/dL  High:  190-219 mg/dL  Very High:  Greater than or equal to 220 mg/dL   Comprehensive metabolic panel (BMP + Alb, Alk Phos, ALT, AST, Total. Bili, TP)     Status: Abnormal   Result Value Ref Range    Sodium 133 (L) 136 - 145 mmol/L    Potassium 4.4 3.4 - 5.3 mmol/L    Chloride 98 98 - 107 mmol/L    Carbon Dioxide (CO2) 24 22 - 29 mmol/L    Anion Gap 11 7 - 15 mmol/L    Urea Nitrogen 21.6 8.0 - 23.0 mg/dL    Creatinine 1.05 (H) 0.51 - 0.95 mg/dL    Calcium 8.9 8.8 - 10.2 mg/dL    Glucose 96 70 - 99 mg/dL    Alkaline Phosphatase 138 (H) 35 - 104 U/L    AST 44 (H) 10 - 35 U/L    ALT 42 (H) 10 - 35 U/L    Protein Total 6.8 6.4 - 8.3 g/dL    Albumin 4.0 3.5 - 5.2 g/dL    Bilirubin Total 0.5 <=1.2 mg/dL    GFR Estimate 56 (L) >60 mL/min/1.73m2

## 2023-01-21 PROBLEM — F17.200 TOBACCO USE DISORDER: Status: ACTIVE | Noted: 2023-01-21

## 2023-01-23 ENCOUNTER — TELEPHONE (OUTPATIENT)
Dept: FAMILY MEDICINE | Facility: CLINIC | Age: 73
End: 2023-01-23
Payer: COMMERCIAL

## 2023-01-23 NOTE — TELEPHONE ENCOUNTER
----- Message from Deo Love Mai, MD sent at 1/21/2023 12:19 PM CST -----  Please let patient know that her thyroid level was normal.  Her cholesterol looked good and therapeutic; no change the Lipitor dose.  Her kidney function remained to be slightly low but overall stable.  Electrolytes were normal except the sodium is slightly low.  Will recheck again in couple months - ordered and please be sure to get it done at that time.  If it remains to be low, will consider switching to a different medication for her high blood pressure.  Her liver enzymes were slightly elevated which is new.  Will keep an eye on it for now.  CBC was normal, no anemia.      I reviewed her medical record, her last ultrasound of the heart in 2020 while she was in the hospital was slightly abnormal.  Her EKG also was abnormal but overall stable.  Due to her history of stroke, I will refer her to see heart doctor for a second opinion and further evaluation if indicated.    Thanks.  Deo

## 2023-01-23 NOTE — LETTER
January 26, 2023      Tiara Smith  [address] [facility] [address]        Dear ,    We are writing to inform you of your test results.    Thyroid level was normal.  Her cholesterol looked good and therapeutic; no change the Lipitor dose.  Her kidney function remained to be slightly low but overall stable.  Electrolytes were normal except the sodium is slightly low.  Will recheck again in couple months - ordered and please be sure to get it done at that time.  If it remains to be low, will consider switching to a different medication for her high blood pressure.  Her liver enzymes were slightly elevated which is new.  Will keep an eye on it for now.  CBC was normal, no anemia.       I reviewed her medical record, her last ultrasound of the heart in 2020 while she was in the hospital was slightly abnormal.  Her EKG also was abnormal but overall stable.  Due to her history of stroke, I will refer her to see heart doctor for a second opinion and further evaluation if indicated.    Resulted Orders   TSH with free T4 reflex   Result Value Ref Range    TSH 1.70 0.30 - 4.20 uIU/mL   CBC with platelets   Result Value Ref Range    WBC Count 5.9 4.0 - 11.0 10e3/uL    RBC Count 4.55 3.80 - 5.20 10e6/uL    Hemoglobin 13.3 11.7 - 15.7 g/dL    Hematocrit 40.8 35.0 - 47.0 %    MCV 90 78 - 100 fL    MCH 29.2 26.5 - 33.0 pg    MCHC 32.6 31.5 - 36.5 g/dL    RDW 14.5 10.0 - 15.0 %    Platelet Count 272 150 - 450 10e3/uL   Lipid panel reflex to direct LDL Fasting   Result Value Ref Range    Cholesterol 145 <200 mg/dL    Triglycerides 87 <150 mg/dL    Direct Measure HDL 63 >=50 mg/dL    LDL Cholesterol Calculated 65 <=100 mg/dL    Non HDL Cholesterol 82 <130 mg/dL    Narrative    Cholesterol  Desirable:  <200 mg/dL    Triglycerides  Normal:  Less than 150 mg/dL  Borderline High:  150-199 mg/dL  High:  200-499 mg/dL  Very High:  Greater than or equal to 500 mg/dL    Direct Measure HDL  Female:  Greater than or equal to  50 mg/dL   Male:  Greater than or equal to 40 mg/dL    LDL Cholesterol  Desirable:  <100mg/dL  Above Desirable:  100-129 mg/dL   Borderline High:  130-159 mg/dL   High:  160-189 mg/dL   Very High:  >= 190 mg/dL    Non HDL Cholesterol  Desirable:  130 mg/dL  Above Desirable:  130-159 mg/dL  Borderline High:  160-189 mg/dL  High:  190-219 mg/dL  Very High:  Greater than or equal to 220 mg/dL   Comprehensive metabolic panel (BMP + Alb, Alk Phos, ALT, AST, Total. Bili, TP)   Result Value Ref Range    Sodium 133 (L) 136 - 145 mmol/L    Potassium 4.4 3.4 - 5.3 mmol/L    Chloride 98 98 - 107 mmol/L    Carbon Dioxide (CO2) 24 22 - 29 mmol/L    Anion Gap 11 7 - 15 mmol/L    Urea Nitrogen 21.6 8.0 - 23.0 mg/dL    Creatinine 1.05 (H) 0.51 - 0.95 mg/dL    Calcium 8.9 8.8 - 10.2 mg/dL    Glucose 96 70 - 99 mg/dL    Alkaline Phosphatase 138 (H) 35 - 104 U/L    AST 44 (H) 10 - 35 U/L    ALT 42 (H) 10 - 35 U/L    Protein Total 6.8 6.4 - 8.3 g/dL    Albumin 4.0 3.5 - 5.2 g/dL    Bilirubin Total 0.5 <=1.2 mg/dL    GFR Estimate 56 (L) >60 mL/min/1.73m2      Comment:      Effective December 21, 2021 eGFRcr in adults is calculated using the 2021 CKD-EPI creatinine equation which includes age and gender (Chao et al., NEJM, DOI: 10.1056/HHRKou9438897)       If you have any questions or concerns, please call the clinic at the number listed above.       Sincerely,      Deo Arevalo MD/ Care Team

## 2023-01-23 NOTE — TELEPHONE ENCOUNTER
Deo Arevalo MD  [facility]  The patient know that it also referred her to cardiology due to stable but abnormal EKG and her last ultrasound of the heart couple years ago last he was in the hospital was also abnormal.

## 2023-01-26 ENCOUNTER — HOSPITAL ENCOUNTER (OUTPATIENT)
Dept: PHYSICAL THERAPY | Facility: CLINIC | Age: 73
Setting detail: THERAPIES SERIES
Discharge: HOME | End: 2023-01-26
Attending: FAMILY MEDICINE
Payer: COMMERCIAL

## 2023-01-26 DIAGNOSIS — R29.6 FALLS FREQUENTLY: ICD-10-CM

## 2023-01-26 PROCEDURE — 97110 THERAPEUTIC EXERCISES: CPT | Mod: GP | Performed by: PHYSICAL THERAPIST

## 2023-01-26 PROCEDURE — 97161 PT EVAL LOW COMPLEX 20 MIN: CPT | Mod: GP | Performed by: PHYSICAL THERAPIST

## 2023-01-26 NOTE — TELEPHONE ENCOUNTER
2nd attempt will send letter and patient is already scheduled for Cardiology.     Missy Mcneal MA 1/26/2023

## 2023-01-26 NOTE — PROGRESS NOTES
01/26/23 1315   Quick Adds   Quick Adds Certification   Type of Visit Initial OP PT Evaluation   General Information   Start of Care Date 01/26/23   Referring Physician Deo Arevalo MD   Orders Evaluate and Treat as Indicated   Order Date 01/20/23   Medical Diagnosis Falls frequently (R29.6)   Onset of illness/injury or Date of Surgery 01/16/23   Surgical/Medical history reviewed Yes   Pertinent history of current problem (include personal factors and/or comorbidities that impact the POC) Pt is a 71 yo female who presents to PT with reports of several falls in the past several weeks, mostly d/t icy surfaces. Pt reports she is not able to get up when she falls, though, requiring the assistance of others. Daughter is present for eval and also reports that pt has poor walking tolerance, pt reports that her R leg shakes when she walks or stands for long periods of time.   Prior level of functional mobility Transfers;Ambulation   Transfers Independent   Ambulation Independent without AD   Current Community Support Family/friend caregiver   Patient role/Employment history Retired   Living environment Other, comments  (Mobile home with 6 [address], B rails)   Home/Community Accessibility Comments Pt is not driving   Current Assistive Devices Standard Cane;Front Wheeled Walker  (Pt not currently using any AD, although would benefit from use)   Patient/Family Goals Statement Pt wants to walk better, would like to be able to get off the floor if she falls   General Information Comments Hx of CVA (Nov 3, 2020), high blood pressure, high cholesterol   Fall Risk Screen   Fall screen completed by PT   Have you fallen 2 or more times in the past year? Yes   Have you fallen and had an injury in the past year? No   Timed Up and Go score (seconds) 15.1   Is patient a fall risk? Yes   Abuse Screen (yes response referral indicated)   Feels Unsafe at Home or Work/School no   Feels Threatened by Someone no   Does Anyone Try to Keep You From  Having Contact with Others or Doing Things Outside Your Home? no   Physical Signs of Abuse Present no   Patient needs abuse support services and resources No   Gait   Gait Gait Analysis   Gait Analysis   Gait Pattern Used 2-point gait   Gait Deviations Noted increased stride width;decreased weight-shifting ability   Impairments Contributing to Gait Deviations impaired balance;impaired postural control;decreased strength   Gait Special Tests   Gait Special Tests FUNCTIONAL GAIT ASSESSMENT   Gait Special Tests Functional Gait Assessment Score out of 30   Score out of 30 7   Planned Therapy Interventions   Planned Therapy Interventions balance training;gait training;neuromuscular re-education;strengthening;stretching   Clinical Impression   Criteria for Skilled Therapeutic Interventions Met yes, treatment indicated   PT Diagnosis Impaired balance   Influenced by the following impairments Weakness in B hips, ankles, impaired balance and righting reactions   Functional limitations due to impairments Increased falls, difficulty in transfers from floor   Clinical Presentation Stable/Uncomplicated   Clinical Presentation Rationale Pt with chronic sx of CVA, stable strength and functional mobility deficits   Clinical Decision Making (Complexity) Low complexity   Therapy Frequency 1 time/week   Predicted Duration of Therapy Intervention (days/wks) 12 weeks   Risk & Benefits of therapy have been explained Yes   Patient, Family & other staff in agreement with plan of care Yes   Clinical Impression Comments Pt presents with significant deficits in hip and ankle strength, impaired balance and righting reactions putting her at significantly increased risk for falls and injury with recent falls noted. Pt also shows weakness in some functional mobility, needing max to total assist to transition from floor sitting to standing without any assistive devices. Pt gait is impaired with instability noted, path deviations, and instability  with direction changes. Pt would benefit from skilled PT interventions in order to address her weakness, impaired functional mobility, balance, and floor transfers so that she may reduce her falls and injury risk for greater independence and safety in walking at home and in the community.   Education Assessment   Preferred Learning Style Listening;Demonstration;Pictures/video   Barriers to Learning No barriers   GOALS   PT Eval Goals 1;2;3;4   Goal 1   Goal Identifier HEP   Goal Description Pt will demo independence in performance and progression of strengthening and balance HEP in order to maintain and advance CLOF.   Target Date 02/16/23   Goal 2   Goal Identifier FGA   Goal Description Pt will demo improved balance and mobility as shown by increased FGA score of at least 4 points in order to show significant improvement and reduced falls risk.   Goal Progress 7/30 (eval)   Target Date 03/09/23   Goal 3   Goal Identifier Floor Transfer   Goal Description Pt will demo ability to perform standing to floor and floor to standing transfers without assistance to show improved functional strength and ability to perform functional floor transfers in case of fall.   Goal Progress Pt requires max to total assist to safely complete   Target Date 04/20/23   Goal 4   Goal Identifier Functional Strength   Goal Description Pt will demo improved functional strength and overall well-being as shown by 5 times sit to stand score <13 seconds without use of UEs.   Goal Progress 22 seconds   Target Date 04/20/23   Total Evaluation Time   PT Eval, Low Complexity Minutes (61153) 20   Therapy Certification   Certification date from 01/26/23   Certification date to 04/20/23   Medical Diagnosis Falls frequently (R29.6)   Certification I certify the need for these services furnished under this plan of treatment and while under my care.  (Physician co-signature of this document indicates review and certification of the therapy plan).

## 2023-01-26 NOTE — PROGRESS NOTES
[facility]                                                                                   OUTPATIENT PHYSICAL THERAPY FUNCTIONAL EVALUATION  PLAN OF TREATMENT FOR OUTPATIENT REHABILITATION  (COMPLETE FOR INITIAL CLAIMS ONLY)  Patient's Last Name, First Name, M.I.  YOB: 1950  Tiara Smith     Provider's Name   [facility]   Medical Record No.  [id number]     Start of Care Date:  01/26/23   Onset Date:  01/16/23   Type:     _X__PT   ____OT  ____SLP Medical Diagnosis:  Falls frequently (R29.6)     PT Diagnosis:  Impaired balance Visits from SOC:  1                              __________________________________________________________________________________  Plan of Treatment/Functional Goals:  balance training, gait training, neuromuscular re-education, strengthening, stretching           GOALS  HEP  Pt will demo independence in performance and progression of strengthening and balance HEP in order to maintain and advance CLOF.  02/16/23    FGA  Pt will demo improved balance and mobility as shown by increased FGA score of at least 4 points in order to show significant improvement and reduced falls risk.  03/09/23    Floor Transfer  Pt will demo ability to perform standing to floor and floor to standing transfers without assistance to show improved functional strength and ability to perform functional floor transfers in case of fall.  04/20/23    Functional Strength  Pt will demo improved functional strength and overall well-being as shown by 5 times sit to stand score <13 seconds without use of UEs.  04/20/23                                                Therapy Frequency:  1 time/week   Predicted Duration of Therapy Intervention:  12 weeks    Anjel Hammer, PT                                    I CERTIFY THE NEED FOR THESE SERVICES FURNISHED UNDER        THIS  PLAN OF TREATMENT AND WHILE UNDER MY CARE     (Physician co-signature of this document indicates review and certification of the therapy plan).              Certification Date From:  01/26/23   Certification Date To:  04/20/23    Referring Provider:  Deo Arevalo MD    Initial Assessment  See Epic Evaluation- Start of Care Date: 01/26/23

## 2023-02-02 ENCOUNTER — HOSPITAL ENCOUNTER (OUTPATIENT)
Dept: PHYSICAL THERAPY | Facility: CLINIC | Age: 73
Setting detail: THERAPIES SERIES
Discharge: HOME | End: 2023-02-02
Attending: FAMILY MEDICINE
Payer: COMMERCIAL

## 2023-02-02 PROCEDURE — 97112 NEUROMUSCULAR REEDUCATION: CPT | Mod: GP | Performed by: PHYSICAL THERAPIST

## 2023-02-02 PROCEDURE — 97110 THERAPEUTIC EXERCISES: CPT | Mod: GP | Performed by: PHYSICAL THERAPIST

## 2023-02-07 ENCOUNTER — HOSPITAL ENCOUNTER (OUTPATIENT)
Dept: PHYSICAL THERAPY | Facility: CLINIC | Age: 73
Setting detail: THERAPIES SERIES
Discharge: HOME | End: 2023-02-07
Attending: FAMILY MEDICINE
Payer: COMMERCIAL

## 2023-02-07 PROCEDURE — 97112 NEUROMUSCULAR REEDUCATION: CPT | Mod: GP | Performed by: PHYSICAL THERAPIST

## 2023-02-07 PROCEDURE — 97110 THERAPEUTIC EXERCISES: CPT | Mod: GP | Performed by: PHYSICAL THERAPIST

## 2023-02-16 ENCOUNTER — HOSPITAL ENCOUNTER (OUTPATIENT)
Dept: PHYSICAL THERAPY | Facility: CLINIC | Age: 73
Setting detail: THERAPIES SERIES
Discharge: HOME | End: 2023-02-16
Attending: FAMILY MEDICINE
Payer: COMMERCIAL

## 2023-02-16 PROCEDURE — 97530 THERAPEUTIC ACTIVITIES: CPT | Mod: GP | Performed by: PHYSICAL THERAPIST

## 2023-02-16 PROCEDURE — 97112 NEUROMUSCULAR REEDUCATION: CPT | Mod: GP | Performed by: PHYSICAL THERAPIST

## 2023-02-20 ENCOUNTER — HOSPITAL ENCOUNTER (OUTPATIENT)
Dept: PHYSICAL THERAPY | Facility: CLINIC | Age: 73
Setting detail: THERAPIES SERIES
Discharge: HOME | End: 2023-02-20
Attending: FAMILY MEDICINE
Payer: COMMERCIAL

## 2023-02-20 PROCEDURE — 97110 THERAPEUTIC EXERCISES: CPT | Mod: GP | Performed by: PHYSICAL THERAPIST

## 2023-02-20 PROCEDURE — 97112 NEUROMUSCULAR REEDUCATION: CPT | Mod: GP | Performed by: PHYSICAL THERAPIST

## 2023-03-01 ENCOUNTER — OFFICE VISIT (OUTPATIENT)
Dept: CARDIOLOGY | Facility: CLINIC | Age: 73
End: 2023-03-01
Payer: COMMERCIAL

## 2023-03-01 VITALS
WEIGHT: 195.2 LBS | SYSTOLIC BLOOD PRESSURE: 120 MMHG | HEIGHT: 65 IN | HEART RATE: 82 BPM | DIASTOLIC BLOOD PRESSURE: 102 MMHG | OXYGEN SATURATION: 98 % | BODY MASS INDEX: 32.52 KG/M2

## 2023-03-01 DIAGNOSIS — R94.31 NONSPECIFIC ABNORMAL ELECTROCARDIOGRAM (ECG) (EKG): ICD-10-CM

## 2023-03-01 DIAGNOSIS — E78.5 HYPERLIPIDEMIA LDL GOAL <70: ICD-10-CM

## 2023-03-01 DIAGNOSIS — I10 HTN, GOAL BELOW 130/80: ICD-10-CM

## 2023-03-01 DIAGNOSIS — I63.519 CEREBROVASCULAR ACCIDENT (CVA) DUE TO OCCLUSION OF MIDDLE CEREBRAL ARTERY, UNSPECIFIED BLOOD VESSEL LATERALITY (H): ICD-10-CM

## 2023-03-01 PROCEDURE — 99204 OFFICE O/P NEW MOD 45 MIN: CPT | Performed by: INTERNAL MEDICINE

## 2023-03-01 RX ORDER — LISINOPRIL 40 MG/1
40 TABLET ORAL DAILY
Qty: 30 TABLET | Refills: 11 | Status: SHIPPED | OUTPATIENT
Start: 2023-03-01 | End: 2024-02-01

## 2023-03-01 NOTE — LETTER
3/1/2023    Deo Love Mai, MD  [address] [place] [address]    RE: Tiara Smith       Dear Colleague,     I had the pleasure of seeing Tiara Smith in the [facility].  CARDIOLOGY CONSULT    REASON FOR CONSULT:  PVCs, hypertension, hyperlipidemia      PRIMARY CARE PHYSICIAN:  Deo Love Mai    HISTORY OF PRESENT ILLNESS: Ms. Smith is a very pleasant 72-year-old with past medical history significant for hypertension, hyperlipidemia and tobacco abuse who presents for the evaluation PVCs cardiac risk factors.  Tiara states that she was hospitalized with a CVA in 2020.  At that time she underwent an echocardiogram that demonstrated normal LV function.  She had a cardiac monitor that demonstrated PVCs with a PVC burden of 3%.  She has a strong history of tobacco abuse and at that time quit smoking for a full year.  She states since that time she has resumed smoking about 4 cigarettes/day.  Tiara denies any exertional chest pain, chest discomfort or shortness of breath.  She denies any lightheadedness or dizziness.  She denies any heart palpitations or racing heart.  She is without cardiovascular complaints.    PAST MEDICAL HISTORY:  1.  Hypertension  2.  Hyperlipidemia  3.  PVCs by monitor in 2020  4.  History of CVA  5.  History of tobacco abuse    MEDICATIONS:  Current Outpatient Medications   Medication     aspirin (ASA) 81 MG chewable tablet     atorvastatin (LIPITOR) 40 MG tablet     lisinopril (ZESTRIL) 30 MG tablet     hydrochlorothiazide (HYDRODIURIL) 25 MG tablet     No current facility-administered medications for this visit.       ALLERGIES:  Allergies   Allergen Reactions     Codeine      thick tongue, diff. speaking to [employer] #3       SOCIAL HISTORY:  Tiara is a 60+ year smoking history.  She is currently smoking 4 cigarettes/day.  She denies any alcohol use.  She lives in [place] in a trailer home with her .    FAMILY HISTORY:  Father had a CVA in his  70s    REVIEW OF SYSTEMS:  A complete ROS was obtained and the pertinent positives are outlined in the history of present illness above.  The remainder of systems is negative.      PHYSICAL EXAM:      BP: (!) 120/102 (left arm) Pulse: 82     SpO2: 98 %      Vital Signs with Ranges  Pulse:  [82] 82  BP: (120-126)/() 120/102  SpO2:  [98 %] 98 %  195 lbs 3.2 oz    Constitutional: awake, alert, no distress  Eyes: PERRL, sclera nonicteric  ENT: trachea midline  Respiratory: Clear to auscultation bilaterally  Cardiovascular: Regular rate and rhythm without murmurs or gallops  GI: nondistended, nontender, bowel sounds present  Lymph/Hematologic: no lymphadenopathy  Skin: dry, no rash  Musculoskeletal: good muscle tone, strength 5/5 in upper and lower extremities  Neurologic: no focal deficits  Neuropsychiatric: appropriate affact    DATA:      EKG: Dated January 20 2023 reviewed personally.  Normal sinus rhythm with frequent PVCs    Echocardiogram dated November 4, 2020 reviewed personally    Normal LV function, EF 55 to 60%.  Moderate LVH  No significant valve abnormalities    Cardiac event monitor dated November 4, 2020  PVC volume 3%    ASSESSMENT:  1.  PVCs, overall well low burden.  Asymptomatic.  2.  Hypertension: Above goal  3.  Hyperlipidemia: At goal on statin  4.  Tobacco abuse, ongoing  5.  History of CVA    RECOMMENDATIONS:  1.  Reviewed the pathophysiology of PVCs.  She is entirely asymptomatic with normal LV function and overall reassurance was provided in this regard.  2.  We discussed at length risk factors for cardiovascular disease as particularly as it relates to her history of high blood pressure high cholesterol and tobacco abuse.  We discussed the importance of making sure that these are aggressively treated.  3.  Plan to increase lisinopril to 40 mg p.o. daily.  Repeat BMP in 1 week.  4.  Discussed the importance of complete abstinence from tobacco products.  She has decreased her usage down to  4 cigarettes per day and was commended on this progress.  No I have encouraged her to add a low-dose nicotine patch or use to eliminate cigarettes entirely.  5.  Discussed the importance of regular exercise and eating a heart healthy diet.  6.  Given history of high blood pressure and PVCs, will update echocardiogram for surveillance to reassess LV function.  7.  She should follow-up with her primary care physician on an ongoing basis for ongoing primary prevention.      Dalia Mendoza MD [facility]  March 1, 2023    Thank you for allowing me to participate in the care of your patient.      Sincerely,     Dalia Mendoza MD     [facility]  cc:   Deo Love Mai, MD  [address] [place] [address]

## 2023-03-01 NOTE — PROGRESS NOTES
CARDIOLOGY CONSULT    REASON FOR CONSULT:  PVCs, hypertension, hyperlipidemia      PRIMARY CARE PHYSICIAN:  Deo Love Mai    HISTORY OF PRESENT ILLNESS: Ms. Smith is a very pleasant 72-year-old with past medical history significant for hypertension, hyperlipidemia and tobacco abuse who presents for the evaluation PVCs cardiac risk factors.  Tiara states that she was hospitalized with a CVA in 2020.  At that time she underwent an echocardiogram that demonstrated normal LV function.  She had a cardiac monitor that demonstrated PVCs with a PVC burden of 3%.  She has a strong history of tobacco abuse and at that time quit smoking for a full year.  She states since that time she has resumed smoking about 4 cigarettes/day.  Tiara denies any exertional chest pain, chest discomfort or shortness of breath.  She denies any lightheadedness or dizziness.  She denies any heart palpitations or racing heart.  She is without cardiovascular complaints.    PAST MEDICAL HISTORY:  1.  Hypertension  2.  Hyperlipidemia  3.  PVCs by monitor in 2020  4.  History of CVA  5.  History of tobacco abuse    MEDICATIONS:  Current Outpatient Medications   Medication     aspirin (ASA) 81 MG chewable tablet     atorvastatin (LIPITOR) 40 MG tablet     lisinopril (ZESTRIL) 30 MG tablet     hydrochlorothiazide (HYDRODIURIL) 25 MG tablet     No current facility-administered medications for this visit.       ALLERGIES:  Allergies   Allergen Reactions     Codeine      thick tongue, diff. speaking to [employer] #3       SOCIAL HISTORY:  Tiara is a 60+ year smoking history.  She is currently smoking 4 cigarettes/day.  She denies any alcohol use.  She lives in [place] in a trailer home with her .    FAMILY HISTORY:  Father had a CVA in his 70s    REVIEW OF SYSTEMS:  A complete ROS was obtained and the pertinent positives are outlined in the history of present illness above.  The remainder of systems is negative.      PHYSICAL EXAM:      BP: (!)  120/102 (left arm) Pulse: 82     SpO2: 98 %      Vital Signs with Ranges  Pulse:  [82] 82  BP: (120-126)/() 120/102  SpO2:  [98 %] 98 %  195 lbs 3.2 oz    Constitutional: awake, alert, no distress  Eyes: PERRL, sclera nonicteric  ENT: trachea midline  Respiratory: Clear to auscultation bilaterally  Cardiovascular: Regular rate and rhythm without murmurs or gallops  GI: nondistended, nontender, bowel sounds present  Lymph/Hematologic: no lymphadenopathy  Skin: dry, no rash  Musculoskeletal: good muscle tone, strength 5/5 in upper and lower extremities  Neurologic: no focal deficits  Neuropsychiatric: appropriate affact    DATA:      EKG: Dated January 20 2023 reviewed personally.  Normal sinus rhythm with frequent PVCs    Echocardiogram dated November 4, 2020 reviewed personally    Normal LV function, EF 55 to 60%.  Moderate LVH  No significant valve abnormalities    Cardiac event monitor dated November 4, 2020  PVC volume 3%    ASSESSMENT:  1.  PVCs, overall well low burden.  Asymptomatic.  2.  Hypertension: Above goal  3.  Hyperlipidemia: At goal on statin  4.  Tobacco abuse, ongoing  5.  History of CVA    RECOMMENDATIONS:  1.  Reviewed the pathophysiology of PVCs.  She is entirely asymptomatic with normal LV function and overall reassurance was provided in this regard.  2.  We discussed at length risk factors for cardiovascular disease as particularly as it relates to her history of high blood pressure high cholesterol and tobacco abuse.  We discussed the importance of making sure that these are aggressively treated.  3.  Plan to increase lisinopril to 40 mg p.o. daily.  Repeat BMP in 1 week.  4.  Discussed the importance of complete abstinence from tobacco products.  She has decreased her usage down to 4 cigarettes per day and was commended on this progress.  No I have encouraged her to add a low-dose nicotine patch or use to eliminate cigarettes entirely.  5.  Discussed the importance of regular exercise  and eating a heart healthy diet.  6.  Given history of high blood pressure and PVCs, will update echocardiogram for surveillance to reassess LV function.  7.  She should follow-up with her primary care physician on an ongoing basis for ongoing primary prevention.      Dalia Mendoza MD [facility]  March 1, 2023

## 2023-03-01 NOTE — PATIENT INSTRUCTIONS
-Increase lisinopril 40 mg daily  -Lab work in one week  -Schedule echocardiogram  -Use nicotine patch or gum to cut cigarettes completely  -Work on increasing heart healthy foods:  fruits, vegetables, lean protein, high fiber  -walking is a great form of cardiovascular exercise

## 2023-03-02 ENCOUNTER — HOSPITAL ENCOUNTER (OUTPATIENT)
Dept: PHYSICAL THERAPY | Facility: CLINIC | Age: 73
Setting detail: THERAPIES SERIES
Discharge: HOME | End: 2023-03-02
Attending: FAMILY MEDICINE
Payer: COMMERCIAL

## 2023-03-02 PROCEDURE — 97110 THERAPEUTIC EXERCISES: CPT | Mod: GP | Performed by: PHYSICAL THERAPIST

## 2023-03-02 PROCEDURE — 97112 NEUROMUSCULAR REEDUCATION: CPT | Mod: GP | Performed by: PHYSICAL THERAPIST

## 2023-03-06 ENCOUNTER — HOSPITAL ENCOUNTER (OUTPATIENT)
Dept: PHYSICAL THERAPY | Facility: CLINIC | Age: 73
Setting detail: THERAPIES SERIES
Discharge: HOME | End: 2023-03-06
Attending: FAMILY MEDICINE
Payer: COMMERCIAL

## 2023-03-06 PROCEDURE — 97110 THERAPEUTIC EXERCISES: CPT | Mod: GP | Performed by: PHYSICAL THERAPIST

## 2023-03-06 PROCEDURE — 97112 NEUROMUSCULAR REEDUCATION: CPT | Mod: GP | Performed by: PHYSICAL THERAPIST

## 2023-03-08 ENCOUNTER — LAB (OUTPATIENT)
Dept: LAB | Facility: CLINIC | Age: 73
End: 2023-03-08
Payer: COMMERCIAL

## 2023-03-08 DIAGNOSIS — R79.89 ELEVATED LFTS: ICD-10-CM

## 2023-03-08 DIAGNOSIS — I10 HTN, GOAL BELOW 130/80: ICD-10-CM

## 2023-03-08 DIAGNOSIS — I63.519 CEREBROVASCULAR ACCIDENT (CVA) DUE TO OCCLUSION OF MIDDLE CEREBRAL ARTERY, UNSPECIFIED BLOOD VESSEL LATERALITY (H): ICD-10-CM

## 2023-03-08 DIAGNOSIS — E87.1 HYPONATREMIA: ICD-10-CM

## 2023-03-08 LAB
ALBUMIN SERPL BCG-MCNC: 3.9 G/DL (ref 3.5–5.2)
ALP SERPL-CCNC: 111 U/L (ref 35–104)
ALT SERPL W P-5'-P-CCNC: 32 U/L (ref 10–35)
ANION GAP SERPL CALCULATED.3IONS-SCNC: 9 MMOL/L (ref 7–15)
AST SERPL W P-5'-P-CCNC: 36 U/L (ref 10–35)
BILIRUB SERPL-MCNC: 0.7 MG/DL
BUN SERPL-MCNC: 18.5 MG/DL (ref 8–23)
CALCIUM SERPL-MCNC: 9.6 MG/DL (ref 8.8–10.2)
CHLORIDE SERPL-SCNC: 96 MMOL/L (ref 98–107)
CREAT SERPL-MCNC: 1.09 MG/DL (ref 0.51–0.95)
DEPRECATED HCO3 PLAS-SCNC: 27 MMOL/L (ref 22–29)
GFR SERPL CREATININE-BSD FRML MDRD: 54 ML/MIN/1.73M2
GLUCOSE SERPL-MCNC: 100 MG/DL (ref 70–99)
POTASSIUM SERPL-SCNC: 4.5 MMOL/L (ref 3.4–5.3)
PROT SERPL-MCNC: 6.8 G/DL (ref 6.4–8.3)
SODIUM SERPL-SCNC: 132 MMOL/L (ref 136–145)

## 2023-03-08 PROCEDURE — 80053 COMPREHEN METABOLIC PANEL: CPT

## 2023-03-08 PROCEDURE — 36415 COLL VENOUS BLD VENIPUNCTURE: CPT

## 2023-03-09 RX ORDER — HYDROCHLOROTHIAZIDE 25 MG/1
25 TABLET ORAL DAILY
Qty: 90 TABLET | Refills: 0 | Status: SHIPPED | OUTPATIENT
Start: 2023-03-09 | End: 2023-06-09

## 2023-03-09 NOTE — TELEPHONE ENCOUNTER
"Requested Prescriptions   Pending Prescriptions Disp Refills    hydrochlorothiazide (HYDRODIURIL) 25 MG tablet [Pharmacy Med Name: HYDROCHLOROTHIAZIDE 25MG TAB] 90 tablet 0     Sig: TAKE 1 TABLET (25 MG) BY MOUTH DAILY       Diuretics (Including Combos) Protocol Failed - 3/8/2023 11:41 AM        Failed - Blood pressure under 140/90 in past 12 months     BP Readings from Last 3 Encounters:   03/01/23 (!) 120/102   01/20/23 118/80   07/05/22 134/86                 Failed - Normal serum creatinine on file in past 12 months     Recent Labs   Lab Test 03/08/23  0946   CR 1.09*              Failed - Normal serum sodium on file in past 12 months     Recent Labs   Lab Test 03/08/23  0946   *              Passed - Recent (12 mo) or future (30 days) visit within the authorizing provider's specialty     Patient has had an office visit with the authorizing provider or a provider within the authorizing providers department within the previous 12 mos or has a future within next 30 days. See \"Patient Info\" tab in inbasket, or \"Choose Columns\" in Meds & Orders section of the refill encounter.              Passed - Medication is active on med list        Passed - Patient is age 18 or older        Passed - No active pregancy on record        Passed - Normal serum potassium on file in past 12 months     Recent Labs   Lab Test 03/08/23  0946   POTASSIUM 4.5                    Passed - No positive pregnancy test in past 12 months             "

## 2023-03-13 ENCOUNTER — HOSPITAL ENCOUNTER (OUTPATIENT)
Dept: PHYSICAL THERAPY | Facility: CLINIC | Age: 73
Setting detail: THERAPIES SERIES
Discharge: HOME | End: 2023-03-13
Attending: FAMILY MEDICINE
Payer: COMMERCIAL

## 2023-03-13 PROCEDURE — 97112 NEUROMUSCULAR REEDUCATION: CPT | Mod: GP | Performed by: PHYSICAL THERAPIST

## 2023-03-13 PROCEDURE — 97110 THERAPEUTIC EXERCISES: CPT | Mod: GP | Performed by: PHYSICAL THERAPIST

## 2023-03-20 ENCOUNTER — HOSPITAL ENCOUNTER (OUTPATIENT)
Dept: PHYSICAL THERAPY | Facility: CLINIC | Age: 73
Setting detail: THERAPIES SERIES
Discharge: HOME | End: 2023-03-20
Attending: FAMILY MEDICINE
Payer: COMMERCIAL

## 2023-03-20 ENCOUNTER — TELEPHONE (OUTPATIENT)
Dept: CARDIOLOGY | Facility: CLINIC | Age: 73
End: 2023-03-20

## 2023-03-20 ENCOUNTER — HOSPITAL ENCOUNTER (OUTPATIENT)
Dept: CARDIOLOGY | Facility: CLINIC | Age: 73
Discharge: HOME | End: 2023-03-20
Attending: INTERNAL MEDICINE | Admitting: INTERNAL MEDICINE
Payer: COMMERCIAL

## 2023-03-20 DIAGNOSIS — I10 HTN, GOAL BELOW 130/80: ICD-10-CM

## 2023-03-20 LAB — LVEF ECHO: NORMAL

## 2023-03-20 PROCEDURE — 93306 TTE W/DOPPLER COMPLETE: CPT

## 2023-03-20 PROCEDURE — 97112 NEUROMUSCULAR REEDUCATION: CPT | Mod: GP | Performed by: PHYSICAL THERAPIST

## 2023-03-20 PROCEDURE — 93306 TTE W/DOPPLER COMPLETE: CPT | Mod: 26 | Performed by: INTERNAL MEDICINE

## 2023-03-20 PROCEDURE — 97110 THERAPEUTIC EXERCISES: CPT | Mod: GP | Performed by: PHYSICAL THERAPIST

## 2023-03-20 NOTE — TELEPHONE ENCOUNTER
----- Message from Dalia Mendoza MD sent at 3/20/2023  3:30 PM CDT -----  Echocardiogram demonstrates normal heart function, no significant valve abnormalities.  Follow up with primary MD for further titration of antihypertensive medications.    Dalia Mendoza MD       Spoke to patients  and reviewed result and Dr. Naranjo recommendation. Pts  verbalized understanding. No further questions or concerns.

## 2023-03-30 ENCOUNTER — HOSPITAL ENCOUNTER (OUTPATIENT)
Dept: PHYSICAL THERAPY | Facility: CLINIC | Age: 73
Setting detail: THERAPIES SERIES
Discharge: HOME | End: 2023-03-30
Attending: FAMILY MEDICINE
Payer: COMMERCIAL

## 2023-03-30 PROCEDURE — 97112 NEUROMUSCULAR REEDUCATION: CPT | Mod: GP | Performed by: PHYSICAL THERAPIST

## 2023-04-13 ENCOUNTER — HOSPITAL ENCOUNTER (OUTPATIENT)
Dept: PHYSICAL THERAPY | Facility: CLINIC | Age: 73
Setting detail: THERAPIES SERIES
Discharge: HOME | End: 2023-04-13
Attending: FAMILY MEDICINE
Payer: COMMERCIAL

## 2023-04-13 PROCEDURE — 97530 THERAPEUTIC ACTIVITIES: CPT | Mod: GP | Performed by: PHYSICAL THERAPIST

## 2023-04-13 PROCEDURE — 97112 NEUROMUSCULAR REEDUCATION: CPT | Mod: GP | Performed by: PHYSICAL THERAPIST

## 2023-04-14 NOTE — PROGRESS NOTES
[facility]    Outpatient Physical Therapy Discharge Note  Patient: Tiara Smith  : 1950    Beginning/End Dates of Reporting Period:  23 to 23 (pt seen for 10 PT visits in that time)    Referring Provider: Deo Arevalo MD    Therapy Diagnosis: Impaired balance     Client Self Report: Pt has good understanding of HEP. Balance is better. No recent falls.    Objective Measurements:   see treatment notes for details.  Pt and daughter with good understanding of HEP.      Goals:  Goal Identifier HEP   Goal Description Pt will demo independence in performance and progression of strengthening and balance HEP in order to maintain and advance CLOF.   Target Date 23   Date Met  23   Progress (detail required for progress note): Pt demos ability to perform exercises at home without difficulty.     Goal Identifier FGA   Goal Description Pt will demo improved balance and mobility as shown by increased FGA score of at least 4 points in order to show significant improvement and reduced falls risk.   Target Date 23   Date Met      Progress (detail required for progress note):  (3/30/23) Clinically significant improvement     Goal Identifier Floor Transfer   Goal Description Pt will demo ability to perform standing to floor and floor to standing transfers without assistance to show improved functional strength and ability to perform functional floor transfers in case of fall.   Target Date 23   Date Met  23   Progress (detail required for progress note): Pt able to transfers from floor as long as something to hold ontot     Goal Identifier Functional Strength   Goal Description Pt will demo improved functional strength and overall well-being as shown by 5 times sit to stand score <13 seconds without use of UEs.   Target Date 23   Date Met  23   Progress (detail  "required for progress note): 13\" on 4/13/23         Plan:  Discharge from therapy.    Discharge:    Reason for Discharge: Patient has met all goals.    Equipment Issued: [email or web address]    Discharge Plan: Patient to continue home program.  "

## 2023-06-08 DIAGNOSIS — I63.519 CEREBROVASCULAR ACCIDENT (CVA) DUE TO OCCLUSION OF MIDDLE CEREBRAL ARTERY, UNSPECIFIED BLOOD VESSEL LATERALITY (H): ICD-10-CM

## 2023-06-08 RX ORDER — ATORVASTATIN CALCIUM 40 MG/1
40 TABLET, FILM COATED ORAL EVERY EVENING
Qty: 90 TABLET | Refills: 0 | Status: SHIPPED | OUTPATIENT
Start: 2023-06-08 | End: 2023-11-28

## 2023-07-10 DIAGNOSIS — I63.519 CEREBROVASCULAR ACCIDENT (CVA) DUE TO OCCLUSION OF MIDDLE CEREBRAL ARTERY, UNSPECIFIED BLOOD VESSEL LATERALITY (H): ICD-10-CM

## 2023-07-10 DIAGNOSIS — I10 HTN, GOAL BELOW 130/80: ICD-10-CM

## 2023-07-12 RX ORDER — HYDROCHLOROTHIAZIDE 25 MG/1
25 TABLET ORAL DAILY
Qty: 30 TABLET | Refills: 0 | Status: SHIPPED | OUTPATIENT
Start: 2023-07-12 | End: 2023-08-14

## 2023-07-12 NOTE — TELEPHONE ENCOUNTER
"Requested Prescriptions   Pending Prescriptions Disp Refills    hydrochlorothiazide (HYDRODIURIL) 25 MG tablet [Pharmacy Med Name: HYDROCHLOROTHIAZIDE 25MG TAB] 30 tablet 0     Sig: TAKE 1 TABLET (25 MG) BY MOUTH DAILY       Diuretics (Including Combos) Protocol Failed - 7/10/2023  3:37 PM        Failed - Blood pressure under 140/90 in past 12 months     BP Readings from Last 3 Encounters:   03/01/23 (!) 120/102   01/20/23 118/80   07/05/22 134/86                 Failed - Normal serum creatinine on file in past 12 months     Recent Labs   Lab Test 03/08/23  0946   CR 1.09*              Failed - Normal serum sodium on file in past 12 months     Recent Labs   Lab Test 03/08/23  0946   *              Passed - Recent (12 mo) or future (30 days) visit within the authorizing provider's specialty     Patient has had an office visit with the authorizing provider or a provider within the authorizing providers department within the previous 12 mos or has a future within next 30 days. See \"Patient Info\" tab in inbasket, or \"Choose Columns\" in Meds & Orders section of the refill encounter.              Passed - Medication is active on med list        Passed - Patient is age 18 or older        Passed - No active pregancy on record        Passed - Normal serum potassium on file in past 12 months     Recent Labs   Lab Test 03/08/23  0946   POTASSIUM 4.5                    Passed - No positive pregnancy test in past 12 months              Paula Arce RN  "

## 2023-08-12 ENCOUNTER — TELEPHONE (OUTPATIENT)
Dept: FAMILY MEDICINE | Facility: CLINIC | Age: 73
End: 2023-08-12
Payer: COMMERCIAL

## 2023-08-12 DIAGNOSIS — I63.519 CEREBROVASCULAR ACCIDENT (CVA) DUE TO OCCLUSION OF MIDDLE CEREBRAL ARTERY, UNSPECIFIED BLOOD VESSEL LATERALITY (H): ICD-10-CM

## 2023-08-12 DIAGNOSIS — I10 HTN, GOAL BELOW 130/80: ICD-10-CM

## 2023-08-12 NOTE — LETTER
August 17, 2023      Tiara Smith  [id number] [facility] [address]        Dear Tiara,     We are concerned about your health care.  We recently provided you with a medication refill.  Many medications require routine follow-up with your Doctor.      At this time we ask that: You schedule an appointment for your annual physical and follow up on your medications.  Call the clinic at [phone] Option 1 to schedule.     Your prescription:  Has been filled. Please schedule a follow up visit for first available appointment. Courtesy refills will be given if needed until your scheduled appointment.     Sincerely,        Kenji Paredes MD  Care Team

## 2023-08-14 RX ORDER — HYDROCHLOROTHIAZIDE 25 MG/1
25 TABLET ORAL DAILY
Qty: 90 TABLET | Refills: 0 | Status: SHIPPED | OUTPATIENT
Start: 2023-08-14 | End: 2023-11-10

## 2023-08-14 NOTE — TELEPHONE ENCOUNTER
"    Requested Prescriptions   Pending Prescriptions Disp Refills    hydrochlorothiazide (HYDRODIURIL) 25 MG tablet [Pharmacy Med Name: HYDROCHLOROTHIAZIDE 25MG TAB] 30 tablet 0     Sig: TAKE 1 TABLET (25 MG) BY MOUTH DAILY       Diuretics (Including Combos) Protocol Failed - 8/12/2023  3:32 PM        Failed - Blood pressure under 140/90 in past 12 months     BP Readings from Last 3 Encounters:   03/01/23 (!) 120/102   01/20/23 118/80   07/05/22 134/86                 Failed - Normal serum creatinine on file in past 12 months     Recent Labs   Lab Test 03/08/23  0946   CR 1.09*              Failed - Normal serum sodium on file in past 12 months     Recent Labs   Lab Test 03/08/23  0946   *              Passed - Recent (12 mo) or future (30 days) visit within the authorizing provider's specialty     Patient has had an office visit with the authorizing provider or a provider within the authorizing providers department within the previous 12 mos or has a future within next 30 days. See \"Patient Info\" tab in inbasket, or \"Choose Columns\" in Meds & Orders section of the refill encounter.              Passed - Medication is active on med list        Passed - Patient is age 18 or older        Passed - No active pregancy on record        Passed - Normal serum potassium on file in past 12 months     Recent Labs   Lab Test 03/08/23  0946   POTASSIUM 4.5                    Passed - No positive pregnancy test in past 12 months             "

## 2023-08-14 NOTE — TELEPHONE ENCOUNTER
3 months med supply refilled, please follow-up before med run out.  She will need a physical, 40 minutes.

## 2023-08-15 NOTE — TELEPHONE ENCOUNTER
Called and LM for patient to call back. Please relay below and help schedule.   Gricelda Larson MA

## 2023-08-17 NOTE — TELEPHONE ENCOUNTER
Unable to reach patient by phone. Letter sent to schedule appointment.   Closing encounter.   Gricelda Larson MA

## 2023-11-09 ENCOUNTER — TELEPHONE (OUTPATIENT)
Dept: FAMILY MEDICINE | Facility: CLINIC | Age: 73
End: 2023-11-09
Payer: COMMERCIAL

## 2023-11-09 DIAGNOSIS — I63.519 CEREBROVASCULAR ACCIDENT (CVA) DUE TO OCCLUSION OF MIDDLE CEREBRAL ARTERY, UNSPECIFIED BLOOD VESSEL LATERALITY (H): ICD-10-CM

## 2023-11-09 DIAGNOSIS — I10 HTN, GOAL BELOW 130/80: ICD-10-CM

## 2023-11-10 RX ORDER — HYDROCHLOROTHIAZIDE 25 MG/1
25 TABLET ORAL DAILY
Qty: 90 TABLET | Refills: 0 | Status: SHIPPED | OUTPATIENT
Start: 2023-11-10 | End: 2024-02-02

## 2023-11-10 NOTE — TELEPHONE ENCOUNTER
3-month supply refilled, please follow-up before med run out.  She needs a physical and general follow up.

## 2023-11-28 DIAGNOSIS — I63.519 CEREBROVASCULAR ACCIDENT (CVA) DUE TO OCCLUSION OF MIDDLE CEREBRAL ARTERY, UNSPECIFIED BLOOD VESSEL LATERALITY (H): ICD-10-CM

## 2023-11-28 RX ORDER — ATORVASTATIN CALCIUM 40 MG/1
40 TABLET, FILM COATED ORAL EVERY EVENING
Qty: 90 TABLET | Refills: 0 | Status: SHIPPED | OUTPATIENT
Start: 2023-11-28 | End: 2024-03-14

## 2024-02-01 DIAGNOSIS — I10 HTN, GOAL BELOW 130/80: ICD-10-CM

## 2024-02-01 DIAGNOSIS — I63.519 CEREBROVASCULAR ACCIDENT (CVA) DUE TO OCCLUSION OF MIDDLE CEREBRAL ARTERY, UNSPECIFIED BLOOD VESSEL LATERALITY (H): ICD-10-CM

## 2024-02-01 NOTE — TELEPHONE ENCOUNTER
Prescribed by  3/1/23. Per RUDY discharged from Cardiology and is to follow up with PCP. Routing to PCP to advise refill.    Mirna Woodson on 2/1/2024 at 11:04 AM

## 2024-02-02 RX ORDER — HYDROCHLOROTHIAZIDE 25 MG/1
25 TABLET ORAL DAILY
Qty: 90 TABLET | Refills: 0 | Status: SHIPPED | OUTPATIENT
Start: 2024-02-02 | End: 2024-05-28

## 2024-02-02 RX ORDER — LISINOPRIL 40 MG/1
40 TABLET ORAL DAILY
Qty: 30 TABLET | Refills: 0 | Status: SHIPPED | OUTPATIENT
Start: 2024-02-02 | End: 2024-02-29

## 2024-02-07 ENCOUNTER — ORDERS ONLY (AUTO-RELEASED) (OUTPATIENT)
Dept: FAMILY MEDICINE | Facility: CLINIC | Age: 74
End: 2024-02-07

## 2024-02-07 ENCOUNTER — OFFICE VISIT (OUTPATIENT)
Dept: FAMILY MEDICINE | Facility: CLINIC | Age: 74
End: 2024-02-07
Payer: COMMERCIAL

## 2024-02-07 VITALS
SYSTOLIC BLOOD PRESSURE: 134 MMHG | HEART RATE: 78 BPM | OXYGEN SATURATION: 95 % | WEIGHT: 195 LBS | BODY MASS INDEX: 32.45 KG/M2 | DIASTOLIC BLOOD PRESSURE: 86 MMHG | RESPIRATION RATE: 18 BRPM | TEMPERATURE: 97.2 F

## 2024-02-07 DIAGNOSIS — Z28.21 NO VACCINATION-PT REFUSE: ICD-10-CM

## 2024-02-07 DIAGNOSIS — Z12.11 COLON CANCER SCREENING: ICD-10-CM

## 2024-02-07 DIAGNOSIS — Z53.9 BREAST CANCER SCREENING NOT PERFORMED: ICD-10-CM

## 2024-02-07 DIAGNOSIS — N18.31 CHRONIC KIDNEY DISEASE, STAGE 3A (H): ICD-10-CM

## 2024-02-07 DIAGNOSIS — I10 HTN, GOAL BELOW 130/80: ICD-10-CM

## 2024-02-07 DIAGNOSIS — E66.811 CLASS 1 OBESITY DUE TO EXCESS CALORIES WITH SERIOUS COMORBIDITY AND BODY MASS INDEX (BMI) OF 32.0 TO 32.9 IN ADULT: ICD-10-CM

## 2024-02-07 DIAGNOSIS — Z86.73 HISTORY OF CVA (CEREBROVASCULAR ACCIDENT): ICD-10-CM

## 2024-02-07 DIAGNOSIS — F17.200 TOBACCO USE DISORDER: ICD-10-CM

## 2024-02-07 DIAGNOSIS — E78.5 HYPERLIPIDEMIA LDL GOAL <70: ICD-10-CM

## 2024-02-07 DIAGNOSIS — E66.09 CLASS 1 OBESITY DUE TO EXCESS CALORIES WITH SERIOUS COMORBIDITY AND BODY MASS INDEX (BMI) OF 32.0 TO 32.9 IN ADULT: ICD-10-CM

## 2024-02-07 DIAGNOSIS — Z00.00 ENCOUNTER FOR MEDICARE ANNUAL WELLNESS EXAM: Primary | ICD-10-CM

## 2024-02-07 DIAGNOSIS — R06.83 SNORING: ICD-10-CM

## 2024-02-07 PROBLEM — E66.9 OBESITY: Status: ACTIVE | Noted: 2024-02-07

## 2024-02-07 LAB
ALBUMIN SERPL BCG-MCNC: 4.3 G/DL (ref 3.5–5.2)
ALP SERPL-CCNC: 126 U/L (ref 40–150)
ALT SERPL W P-5'-P-CCNC: 20 U/L (ref 0–50)
ANION GAP SERPL CALCULATED.3IONS-SCNC: 12 MMOL/L (ref 7–15)
AST SERPL W P-5'-P-CCNC: 35 U/L (ref 0–45)
BILIRUB SERPL-MCNC: 0.8 MG/DL
BUN SERPL-MCNC: 15.2 MG/DL (ref 8–23)
CALCIUM SERPL-MCNC: 9.4 MG/DL (ref 8.8–10.2)
CHLORIDE SERPL-SCNC: 90 MMOL/L (ref 98–107)
CHOLEST SERPL-MCNC: 145 MG/DL
CREAT SERPL-MCNC: 1.03 MG/DL (ref 0.51–0.95)
CREAT UR-MCNC: 76.9 MG/DL
DEPRECATED HCO3 PLAS-SCNC: 24 MMOL/L (ref 22–29)
EGFRCR SERPLBLD CKD-EPI 2021: 57 ML/MIN/1.73M2
ERYTHROCYTE [DISTWIDTH] IN BLOOD BY AUTOMATED COUNT: 13.5 % (ref 10–15)
FASTING STATUS PATIENT QL REPORTED: YES
GLUCOSE SERPL-MCNC: 98 MG/DL (ref 70–99)
HCT VFR BLD AUTO: 42.4 % (ref 35–47)
HDLC SERPL-MCNC: 74 MG/DL
HGB BLD-MCNC: 14.2 G/DL (ref 11.7–15.7)
LDLC SERPL CALC-MCNC: 57 MG/DL
MCH RBC QN AUTO: 29.4 PG (ref 26.5–33)
MCHC RBC AUTO-ENTMCNC: 33.5 G/DL (ref 31.5–36.5)
MCV RBC AUTO: 88 FL (ref 78–100)
MICROALBUMIN UR-MCNC: <12 MG/L
MICROALBUMIN/CREAT UR: NORMAL MG/G{CREAT}
NONHDLC SERPL-MCNC: 71 MG/DL
PLATELET # BLD AUTO: 308 10E3/UL (ref 150–450)
POTASSIUM SERPL-SCNC: 4.6 MMOL/L (ref 3.4–5.3)
PROT SERPL-MCNC: 7.5 G/DL (ref 6.4–8.3)
RBC # BLD AUTO: 4.83 10E6/UL (ref 3.8–5.2)
SODIUM SERPL-SCNC: 126 MMOL/L (ref 135–145)
TRIGL SERPL-MCNC: 69 MG/DL
WBC # BLD AUTO: 5.9 10E3/UL (ref 4–11)

## 2024-02-07 PROCEDURE — 80053 COMPREHEN METABOLIC PANEL: CPT | Performed by: FAMILY MEDICINE

## 2024-02-07 PROCEDURE — 82570 ASSAY OF URINE CREATININE: CPT | Performed by: FAMILY MEDICINE

## 2024-02-07 PROCEDURE — 82043 UR ALBUMIN QUANTITATIVE: CPT | Performed by: FAMILY MEDICINE

## 2024-02-07 PROCEDURE — 80061 LIPID PANEL: CPT | Performed by: FAMILY MEDICINE

## 2024-02-07 PROCEDURE — 36415 COLL VENOUS BLD VENIPUNCTURE: CPT | Performed by: FAMILY MEDICINE

## 2024-02-07 PROCEDURE — 85027 COMPLETE CBC AUTOMATED: CPT | Performed by: FAMILY MEDICINE

## 2024-02-07 PROCEDURE — 99214 OFFICE O/P EST MOD 30 MIN: CPT | Mod: 25 | Performed by: FAMILY MEDICINE

## 2024-02-07 PROCEDURE — G0296 VISIT TO DETERM LDCT ELIG: HCPCS | Mod: GZ | Performed by: FAMILY MEDICINE

## 2024-02-07 PROCEDURE — G0439 PPPS, SUBSEQ VISIT: HCPCS | Performed by: FAMILY MEDICINE

## 2024-02-07 ASSESSMENT — PATIENT HEALTH QUESTIONNAIRE - PHQ9
SUM OF ALL RESPONSES TO PHQ QUESTIONS 1-9: 3
10. IF YOU CHECKED OFF ANY PROBLEMS, HOW DIFFICULT HAVE THESE PROBLEMS MADE IT FOR YOU TO DO YOUR WORK, TAKE CARE OF THINGS AT HOME, OR GET ALONG WITH OTHER PEOPLE: NOT DIFFICULT AT ALL
SUM OF ALL RESPONSES TO PHQ QUESTIONS 1-9: 3

## 2024-02-07 NOTE — PATIENT INSTRUCTIONS
Your Health Risk Assessment indicates you feel you are not in good health    A healthy lifestyle helps keep the body fit and the mind alert. It helps protect you from disease, helps you fight disease, and helps prevent chronic disease (disease that doesn't go away) from getting worse. This is important as you get older and begin to notice twinges in muscles and joints and a decline in the strength and stamina you once took for granted. A healthy lifestyle includes good healthcare, good nutrition, weight control, recreation, and regular exercise. Avoid harmful substances and do what you can to keep safe. Another part of a healthy lifestyle is stay mentally active and socially involved.    Good healthcare   Have a wellness visit every year.   If you have new symptoms, let us know right away. Don't wait until the next checkup.   Take medicines exactly as prescribed and keep your medicines in a safe place. Tell us if your medicine causes problems.   Healthy diet and weight control   Eat 3 or 4 small, nutritious, low-fat, high-fiber meals a day. Include a variety of fruits, vegetables, and whole-grain foods.   Make sure you get enough calcium in your diet. Calcium, vitamin D, and exercise help prevent osteoporosis (bone thinning).   If you live alone, try eating with others when you can. That way you get a good meal and have company while you eat it.   Try to keep a healthy weight. If you eat more calories than your body uses for energy, it will be stored as fat and you will gain weight.     Recreation   Recreation is not limited to sports and team events. It includes any activity that provides relaxation, interest, enjoyment, and exercise. Recreation provides an outlet for physical, mental, and social energy. It can give a sense of worth and achievement. It can help you stay healthy.    Mental Exercise and Social Involvement  Mental and emotional health is as important as physical health. Keep in touch with friends and  family. Stay as active as possible. Continue to learn and challenge yourself.   Things you can do to stay mentally active are:  Learn something new, like a foreign language or musical instrument.   Play SCRABBLE or do crossword puzzles. If you cannot find people to play these games with you at home, you can play them with others on your computer through the Internet.   Join a games club--anything from card games to chess or checkers or lawn bowling.   Start a new hobby.   Go back to school.   Volunteer.   Read.   Keep up with world events.  Learning About Activities of Daily Living  What are activities of daily living?     Activities of daily living (ADLs) are the basic self-care tasks you do every day. As you age, and if you have health problems, you may find that it's harder to do these things for yourself. That's when you may need some help.  Your doctor uses ADLs to measure how much help you need. Knowing what you can and can't do for yourself is an important first step to getting help. And when you have the help you need, you can stay as independent as possible.  Your doctor will want to know if you are able to do tasks such as:  Take a bath or shower without help.  Go to the bathroom by yourself.  Dress and undress without help.  Shave, comb your hair, and brush teeth on your own.  Get in and out of bed or a chair without help.  Feed yourself without help.  If you are having trouble doing basic self-care tasks, talk with your doctor. You may want to bring a caregiver or family member who can help the doctor understand your needs and abilities.  How will a doctor assess your ADLs?  Asking about ADLs is part of a routine health checkup your doctor will likely do as you age. Your health check might be done in a doctor's office, in your home, or at a hospital. The goal is to find out if you are having any problems that could make your health problems worse or that make it unsafe for you to be on your own.  To  measure your ADLs, your doctor will ask how hard it is for you to do routine tasks. He or she may also want to know if you have changed the way you do a task because of a health problem. He or she may watch how you:  Walk back and forth.  Keep your balance while you stand or walk.  Move from sitting to standing or from a bed to a chair.  Button or unbutton a shirt or sweater.  Remove and put on your shoes.  It's normal to feel a little worried or anxious if you find you can't do all the things you used to be able to do. Talking with your doctor about ADLs isn't a test that you either pass or fail. It's just a way to get more information about your health and safety.  Follow-up care is a key part of your treatment and safety. Be sure to make and go to all appointments, and call your doctor if you are having problems. It's also a good idea to know your test results and keep a list of the medicines you take.  Current as of: February 26, 2023               Content Version: 13.8    7544-5895 [employer].   Care instructions adapted under license by your healthcare professional. If you have questions about a medical condition or this instruction, always ask your healthcare professional. [employer] disclaims any warranty or liability for your use of this information.      Preventing Falls: Care Instructions  Injuries and health problems such as trouble walking or poor eyesight can increase your risk of falling. So can some medicines. But there are things you can do to help prevent falls. You can exercise to get stronger. You can also arrange your home to make it safer.    Talk to your doctor about the medicines you take. Ask if any of them increase the risk of falls and whether they can be changed or stopped.   Try to exercise regularly. It can help improve your strength and balance. This can help lower your risk of falling.     Practice fall safety and prevention.    Wear low-heeled shoes that  "fit well and give your feet good support. Talk to your doctor if you have foot problems that make this hard.  Carry a cellphone or wear a medical alert device that you can use to call for help.  Use stepladders instead of chairs to reach high objects. Don't climb if you're at risk for falls. Ask for help, if needed.  Wear the correct eyeglasses, if you need them.    Make your home safer.    Remove rugs, cords, clutter, and furniture from walkways.  Keep your house well lit. Use night-lights in hallways and bathrooms.  Install and use sturdy handrails on stairways.  Wear nonskid footwear, even inside. Don't walk barefoot or in socks without shoes.    Be safe outside.    Use handrails, curb cuts, and ramps whenever possible.  Keep your hands free by using a shoulder bag or backpack.  Try to walk in well-lit areas. Watch out for uneven ground, changes in pavement, and debris.  Be careful in the winter. Walk on the grass or gravel when sidewalks are slippery. Use de-icer on steps and walkways. Add non-slip devices to shoes.    Put grab bars and nonskid mats in your shower or tub and near the toilet. Try to use a shower chair or bath bench when bathing.   Get into a tub or shower by putting in your weaker leg first. Get out with your strong side first. Have a phone or medical alert device in the bathroom with you.   Where can you learn more?  Go to https://www.[employer]net/patiented  Enter G117 in the search box to learn more about \"Preventing Falls: Care Instructions.\"  Current as of: July 18, 2023               Content Version: 13.8    7743-2065 [employer].   Care instructions adapted under license by your healthcare professional. If you have questions about a medical condition or this instruction, always ask your healthcare professional. [employer] disclaims any warranty or liability for your use of this information.      Hearing Loss: Care Instructions  Overview     Hearing loss is a " sudden or slow decrease in how well you hear. It can range from slight to profound. Permanent hearing loss can occur with aging. It also can happen when you are exposed long-term to loud noise. Examples include listening to loud music, riding motorcycles, or being around other loud machines.  Hearing loss can affect your work and home life. It can make you feel lonely or depressed. You may feel that you have lost your independence. But hearing aids and other devices can help you hear better and feel connected to others.  Follow-up care is a key part of your treatment and safety. Be sure to make and go to all appointments, and call your doctor if you are having problems. It's also a good idea to know your test results and keep a list of the medicines you take.  How can you care for yourself at home?  Avoid loud noises whenever possible. This helps keep your hearing from getting worse.  Always wear hearing protection around loud noises.  Wear a hearing aid as directed.  A professional can help you pick a hearing aid that will work best for you.  You can also get hearing aids over the counter for mild to moderate hearing loss.  Have hearing tests as your doctor suggests. They can show whether your hearing has changed. Your hearing aid may need to be adjusted.  Use other devices as needed. These may include:  Telephone amplifiers and hearing aids that can connect to a television, stereo, radio, or microphone.  Devices that use lights or vibrations. These alert you to the doorbell, a ringing telephone, or a baby monitor.  Television closed-captioning. This shows the words at the bottom of the screen. Most new TVs can do this.  TTY (text telephone). This lets you type messages back and forth on the telephone instead of talking or listening. These devices are also called TDD. When messages are typed on the keyboard, they are sent over the phone line to a receiving TTY. The message is shown on a monitor.  Use text  "messaging, social media, and email if it is hard for you to communicate by telephone.  Try to learn a listening technique called speechreading. It is not lipreading. You pay attention to people's gestures, expressions, posture, and tone of voice. These clues can help you understand what a person is saying. Face the person you are talking to, and have them face you. Make sure the lighting is good. You need to see the other person's face clearly.  Think about counseling if you need help to adjust to your hearing loss.  When should you call for help?  Watch closely for changes in your health, and be sure to contact your doctor if:    You think your hearing is getting worse.     You have new symptoms, such as dizziness or nausea.   Where can you learn more?  Go to https://www.[employer].net/patiented  Enter R798 in the search box to learn more about \"Hearing Loss: Care Instructions.\"  Current as of: February 28, 2023               Content Version: 13.8    4457-1434 [employer].   Care instructions adapted under license by your healthcare professional. If you have questions about a medical condition or this instruction, always ask your healthcare professional. [employer] disclaims any warranty or liability for your use of this information.      Preventive Care Advice   This is general advice given by our system to help you stay healthy. However, your care team may have specific advice just for you. Please talk to your care team about your preventive care needs.  Nutrition  Eat 5 or more servings of fruits and vegetables each day.  Try wheat bread, brown rice and whole grain pasta (instead of white bread, rice, and pasta).  Get enough calcium and vitamin D. Check the label on foods and aim for 100% of the RDA (recommended daily allowance).  Lifestyle  Exercise at least 150 minutes each week  (30 minutes a day, 5 days a week).  Do muscle strengthening activities 2 days a week. These help control " your weight and prevent disease.  No smoking.  Wear sunscreen to prevent skin cancer.  Have a dental exam and cleaning every 6 months.  Yearly exams  See your health care team every year to talk about:  Any changes in your health.  Any medicines your care team has prescribed.  Preventive care, family planning, and ways to prevent chronic diseases.  Shots (vaccines)   HPV shots (up to age 26), if you've never had them before.  Hepatitis B shots (up to age 59), if you've never had them before.  COVID-19 shot: Get this shot when it's due.  Flu shot: Get a flu shot every year.  Tetanus shot: Get a tetanus shot every 10 years.  Pneumococcal, hepatitis A, and RSV shots: Ask your care team if you need these based on your risk.  Shingles shot (for age 50 and up)  General health tests  Diabetes screening:  Starting at age 35, Get screened for diabetes at least every 3 years.  If you are younger than age 35, ask your care team if you should be screened for diabetes.  Cholesterol test: At age 39, start having a cholesterol test every 5 years, or more often if advised.  Bone density scan (DEXA): At age 50, ask your care team if you should have this scan for osteoporosis (brittle bones).  Hepatitis C: Get tested at least once in your life.  STIs (sexually transmitted infections)  Before age 24: Ask your care team if you should be screened for STIs.  After age 24: Get screened for STIs if you're at risk. You are at risk for STIs (including HIV) if:  You are sexually active with more than one person.  You don't use condoms every time.  You or a partner was diagnosed with a sexually transmitted infection.  If you are at risk for HIV, ask about PrEP medicine to prevent HIV.  Get tested for HIV at least once in your life, whether you are at risk for HIV or not.  Cancer screening tests  Cervical cancer screening: If you have a cervix, begin getting regular cervical cancer screening tests starting at age 21.  Breast cancer scan  (mammogram): If you've ever had breasts, begin having regular mammograms starting at age 40. This is a scan to check for breast cancer.  Colon cancer screening: It is important to start screening for colon cancer at age 45.  Have a colonoscopy test every 10 years (or more often if you're at risk) Or, ask your provider about stool tests like a FIT test every year or Cologuard test every 3 years.  To learn more about your testing options, visit:   https://[email or web address]/[email or web address].  For help making a decision, visit:   https://bit.Pinterest/ss98998.  Prostate cancer screening test: If you have a prostate, ask your care team if a prostate cancer screening test (PSA) at age 55 is right for you.  Lung cancer screening: If you are a current or former smoker ages 50 to 80, ask your care team if ongoing lung cancer screenings are right for you.  For informational purposes only. Not to replace the advice of your health care provider. Copyright   2023 [employer]. All rights reserved. Clinically reviewed by the [facility] Transitions Program. [employer] [id number] - REV 01/24.

## 2024-02-07 NOTE — PROGRESS NOTES
Annual Wellness Visit     Patient has been advised of split billing requirements and indicates understanding: Yes       {ROOMER if patient is in their first year of Medicare a vision screen is required click here to document the Vison screen and then refresh the note to pull in results  :120802}  Health Care Directive  Patient does not have a Health Care Directive or Living Will: Discussed advance care planning with patient; however, patient declined at this time.  In general, how would you rate your overall physical health? (!) FAIR   Discussed with patient their rating of physical health; information has been provided.   Do you have a special diet?  Regular (no restrictions)  { Click here to complete exercise, social connection and stress questions After questions have been completed, refresh note to pull answers into note  :[id number]}       No data to display              Do you see a dentist two times every year?  (!) NO  The patient was instructed to see the dentist every 6 months.   Have you been more tired than usual lately?  No  If you drink alcohol do you typically have >3 drinks per day or >7 drinks per week? No  Do you have a current opioid prescription? No  Do you use any other controlled substances or medications that are not prescribed by a provider? None  Social History     Tobacco Use    Smoking status: Every Day     Packs/day: 0.50     Years: 30.00     Additional pack years: 0.00     Total pack years: 15.00     Types: Cigarettes    Smokeless tobacco: Never    Tobacco comments:     Agreed to NRT while inpatient and at discharge   Vaping Use    Vaping Use: Never used   Substance Use Topics    Alcohol use: No     Comment: occasional    Drug use: No     {If there are gaps in the social history shown above, please follow the link to update and then refresh the note Link to Social and Substance History :402136}  Needs assistance for the following daily activities: (!) TRANSPORTATION  Which of the following  safety concerns are present in your home?  (!) NO GRAB BARS IN THE BATHROOM   Do you (or your family members) have any concerns about your safety while driving?  No  Do you have any of the following hearing concerns?: (!) I FEEL THAT PEOPLE ARE MUMBLING OR NOT SPEAKING CLEARLY, (!) I NEED TO ASK PEOPLE TO SPEAK UP OR REPEAT THEMSELVES, (!) TROUBLE UNDERSTANDING SOFT OR WHISPERED SPEECH, and (!) TROUBLE UNDERSTANDING SPEECH ON THE TELEPHONE  In the past 6 months, have you been bothered by leaking of urine? No            2024   Fall Risk   Fallen 2 or more times in the past year? Yes   Trouble with walking or balance? No   Gait Speed Test (Document in seconds) 4.9   Gait Speed Test Interpretation Less than or equal to 5.00 seconds - PASS        Today's PHQ-9 Score:       2024    11:34 AM   PHQ-9 SCORE   PHQ-9 Total Score MyChart 3 (Minimal depression)   PHQ-9 Total Score 3        Mammogram: Declined/refused.      History of abnormal Pap smear: NO - age 65 - see link Cervical Cytology Screening Guidelines       The ASCVD Risk score (Yovana BRISENO, et al., 2019) failed to calculate for the following reasons:    The patient has a prior MI or stroke diagnosis    Fracture Risk Assessment Tool  Link to Frax Calculator  Use the information below to complete the Frax calculator  : 1950  Sex: female  Weight (kg): 88.5 kg (actual weight)  Height (cm): 0 cm  Previous Fragility Fracture:  No  History of parent with fractured hip:  No  Current Smoking:  Yes  Patient has been on glucocorticoids for more than 3 months (5mg/day or more): No  Rheumatoid Arthritis on Problem List:  No  Secondary Osteoporosis on Problem List:  No  Consumes 3 or more units of alcohol per day: No  {Link to FRAX Calculated Score Flowsheet  After results are documented in flowsheet, refresh note to pull results into note :467654} {10-year probability of a hip fracture >= 3% or a major osteoporosis-related fracture >= 20% may indicate  treatment:033910}     {Use the storyboard to review patient history, after sections have been marked as reviewed, refresh note to capture documentation:727819}  { REQUIRED AWV use this link to review and update sexual activity history  after section has been marked as reviewed, refresh note to capture documentation:169153}  Reviewed and updated as needed this visit by Provider                    Past Medical History:   Diagnosis Date    Cerebral infarction (H)     Hyperlipidemia     Lumbago     Unspecified essential hypertension      Past Surgical History:   Procedure Laterality Date    HC EXCISION BREAST LESION W XRAY MARKER, OPEN SINGLE  3/25/2005    Left breast.    [facility] COLONOSCOPY W/WO BRUSH/WASH  3/29/2005     BP Readings from Last 3 Encounters:   02/07/24 134/86   03/01/23 (!) 120/102   01/20/23 118/80    Wt Readings from Last 3 Encounters:   02/07/24 88.5 kg (195 lb)   03/01/23 88.5 kg (195 lb 3.2 oz)   01/20/23 87.3 kg (192 lb 6.4 oz)                  Patient Active Problem List   Diagnosis    HTN, goal below 130/80    Hyperlipidemia LDL goal <70    Advanced directives, counseling/discussion    History of CVA (cerebrovascular accident)    Tobacco use disorder    Chronic kidney disease, stage 3a (H)    Obesity    No vaccination-pt refuse    Breast cancer screening not performed     Past Surgical History:   Procedure Laterality Date    HC EXCISION BREAST LESION W XRAY MARKER, OPEN SINGLE  3/25/2005    Left breast.    [facility] COLONOSCOPY W/WO BRUSH/WASH  3/29/2005       Social History     Tobacco Use    Smoking status: Every Day     Packs/day: 0.25     Years: 30.00     Additional pack years: 0.00     Total pack years: 7.50     Types: Cigarettes    Smokeless tobacco: Never   Substance Use Topics    Alcohol use: No     Comment: occasional     Family History   Problem Relation Age of Onset    Osteoporosis Mother     Neurologic Disorder Mother     Genitourinary Problems Mother     Depression Mother     Eye Disorder  "Mother     Hypertension Father     Cerebrovascular Disease Father     Alcohol/Drug Father     Allergies Father     Cancer Father         lung cancer - smoker    Lipids Father     Arthritis Father     Coronary Artery Disease Father     Unknown/Adopted Sister     Cancer Maternal Grandfather         melanoma    Gynecology Daughter     No Known Problems Son     No Known Problems Son     No Known Problems Son          Current Outpatient Medications   Medication Sig Dispense Refill    aspirin (ASA) 81 MG chewable tablet Take 1 tablet (81 mg) by mouth daily 30 tablet 1    atorvastatin (LIPITOR) 40 MG tablet Take 1 tablet (40 mg) by mouth every evening 90 tablet 0    hydrochlorothiazide (HYDRODIURIL) 25 MG tablet TAKE 1 TABLET BY MOUTH EVERY DAY 90 tablet 0    lisinopril (ZESTRIL) 40 MG tablet Take 1 tablet (40 mg) by mouth daily 30 tablet 0    tiotropium (SPIRIVA RESPIMAT) 2.5 MCG/ACT inhaler Inhale 2 puffs into the lungs daily 4 g 4     Allergies   Allergen Reactions    Codeine      Other Reaction(s): \"tongue felt fuzzy\"    Morphine And Related      thick tongue, diff. speaking to [facility] #3     Recent Labs   Lab Test 02/07/24  1229 03/08/23  0946 01/20/23  1501 09/22/22  1359 03/21/22  0943 02/04/21  0938 11/04/20  0631 11/03/20  1547   A1C  --   --   --   --   --   --   --  5.8*   LDL 57  --  65  --  51  --  104*  --    HDL 74  --  63  --  62  --  49*  --    TRIG 69  --  87  --  118  --  81  --    ALT 20 32 42*   < > 28 31 18  --    CR 1.03* 1.09* 1.05*   < > 1.15* 1.04 0.89  --    GFRESTIMATED 57* 54* 56*   < > 51* 54* 66  --    GFRESTBLACK  --   --   --   --   --  63 76  --    POTASSIUM 4.6 4.5 4.4   < > 4.4 3.9 3.8  --    TSH  --   --  1.70  --   --   --   --  1.68    < > = values in this interval not displayed.        Current providers sharing in care for this patient include:  Patient Care Team:  Deo Arevalo MD as PCP - General (Family Medicine)  Dalia Mendoza MD as MD (Cardiovascular Disease)  Reji, " Dalia MAY MD as Assigned Heart and Vascular Provider  Deo Arevalo MD as Assigned PCP    The following health maintenance items are reviewed in Epic and correct as of today:  Health Maintenance   Topic Date Due    ZOSTER IMMUNIZATION (1 of 2) Never done    MAMMO SCREENING  03/17/2007    RSV VACCINE (Pregnancy & 60+) (1 - 1-dose 60+ series) Never done    LUNG CANCER SCREENING  11/03/2021    Pneumococcal Vaccine: 65+ Years (2 of 2 - PCV) 11/24/2021    ANNUAL REVIEW OF HM ORDERS  03/21/2023    MEDICARE ANNUAL WELLNESS VISIT  07/05/2023    COLORECTAL CANCER SCREENING  07/20/2023    INFLUENZA VACCINE (1) Never done    COVID-19 Vaccine (4 - 2023-24 season) 09/01/2023    NICOTINE/TOBACCO CESSATION COUNSELING Q 1 YR  01/20/2024    FALL RISK ASSESSMENT  02/07/2025    GLUCOSE  03/08/2026    ADVANCE CARE PLANNING  07/05/2027    LIPID  01/20/2028    DTAP/TDAP/TD IMMUNIZATION (2 - Td or Tdap) 11/24/2030    DEXA  07/14/2037    HEPATITIS C SCREENING  Completed    PHQ-2 (once per calendar year)  Completed    IPV IMMUNIZATION  Aged Out    HPV IMMUNIZATION  Aged Out    MENINGITIS IMMUNIZATION  Aged Out    RSV MONOCLONAL ANTIBODY  Aged Out       Appropriate preventive services were discussed with this patient, including applicable screening as appropriate for fall prevention, nutrition, physical activity, Tobacco-use cessation, weight loss and cognition.  Checklist reviewing preventive services available has been given to the patient.           2/7/2024   Mini Cog   Clock Draw Score 0 Abnormal   3 Item Recall 2 objects recalled   Mini Cog Total Score 2     {A Mini-Cog total score of 0-2 suggests the possibility of dementia, score of 3-5 suggests no dementia:031792}           {ROS Picklists (Optional):206598}      Objective    /86   Pulse 78   Temp 97.2  F (36.2  C) (Temporal)   Resp 18   Wt 88.5 kg (195 lb)   LMP  (LMP Unknown)   SpO2 95%   BMI 32.45 kg/m    Body mass index is 32.45 kg/m .  Physical Exam   {Exam List  (Optional):341354}    {Diagnostic Test Results (Optional):514930}        Signed Electronically by: Deo Love Mai, MD  {Email feedback regarding this note to [email or web address]   :608172}  Answers submitted by the patient for this visit:  Patient Health Questionnaire (Submitted on 2/7/2024)  If you checked off any problems, how difficult have these problems made it for you to do your work, take care of things at home, or get along with other people?: Not difficult at all  PHQ9 TOTAL SCORE: 3  Hypertension Visit (Submitted on 2/7/2024)  Chief Complaint: Chronic problems general questions HPI Form  Do you check your blood pressure regularly outside of the clinic?: No  Are your blood pressures ever more than 140 on the top number (systolic) OR more than 90 on the bottom number (diastolic)? (For example, greater than 140/90): No  Are you following a low salt diet?: No  General Questionnaire (Submitted on 2/7/2024)  Chief Complaint: Chronic problems general questions HPI Form  What is the reason for your visit today? : hypertension lipids  How many servings of fruits and vegetables do you eat daily?: 0-1  On average, how many sweetened beverages do you drink each day (Examples: soda, juice, sweet tea, etc.  Do NOT count diet or artificially sweetened beverages)?: 0  How many minutes a day do you exercise enough to make your heart beat faster?: 9 or less  How many days a week do you exercise enough to make your heart beat faster?: 3 or less  How many days per week do you miss taking your medication?: 0

## 2024-02-08 ENCOUNTER — TELEPHONE (OUTPATIENT)
Dept: FAMILY MEDICINE | Facility: CLINIC | Age: 74
End: 2024-02-08
Payer: COMMERCIAL

## 2024-02-08 ENCOUNTER — HOSPITAL ENCOUNTER (OUTPATIENT)
Dept: RESPIRATORY THERAPY | Facility: CLINIC | Age: 74
Discharge: HOME | End: 2024-02-08
Attending: FAMILY MEDICINE | Admitting: FAMILY MEDICINE
Payer: COMMERCIAL

## 2024-02-08 DIAGNOSIS — F17.200 TOBACCO USE DISORDER: ICD-10-CM

## 2024-02-08 PROCEDURE — 94060 EVALUATION OF WHEEZING: CPT

## 2024-02-08 PROCEDURE — 999N000156 HC STATISTIC RCP CONSULT EA 30 MIN

## 2024-02-08 PROCEDURE — 94726 PLETHYSMOGRAPHY LUNG VOLUMES: CPT

## 2024-02-08 PROCEDURE — 999N000157 HC STATISTIC RCP TIME EA 10 MIN

## 2024-02-08 PROCEDURE — 94729 DIFFUSING CAPACITY: CPT

## 2024-02-08 NOTE — PROGRESS NOTES
..Pre and post spirometry, DLCO, and Lung volumes completed.  Patient was given Albuterol MDI before post spirometry.

## 2024-02-08 NOTE — DISCHARGE INSTRUCTIONS
Thank you for completing pulmonary function testing today.  All results will be scanned into your epic results for your doctor to review.  Please resume taking all your current prescribed medications and diet as directed by your provider.   If you have not heard from your provider about your testing within two weeks and do not have a follow-up appointment scheduled with them please contact your provider about any questions you have concerning your testing.   Thank you  [facility]

## 2024-02-08 NOTE — TELEPHONE ENCOUNTER
----- Message from Deo Love Mai, MD sent at 2/8/2024 11:08 AM CST -----  Please let patient know that her sodium level is quite low.  I would like her to follow-up to discuss about it - okay with virtual visit if it works better for her.  Also find out how much water she is taking it today.  Her cholesterol level looks good.  CBC was normal, no anemia.  Kidney function remained to be slightly low but overall stable.  Electrolytes was normal.  No diabetes.  Liver enzyme was normal.

## 2024-02-08 NOTE — TELEPHONE ENCOUNTER
Call returned and patient notified of providers note below on her lab results.  Questioned if she drinks a lot of water during the day, and she stated not much water, drinks approx 8 cups of coffee per day.    Informed that provider would like her to follow up with him either in office or virtual to further discuss her low sodium level.  Patient asked if she could be scheduled at the same time as her  on Monday, 2/12 with Dr. Arevalo? Appointment scheduled at 2:00 pm on 2/12, with a 1:40 pm arrival. Catie Greene LPN

## 2024-02-09 LAB
DLCOCOR-%PRED-PRE: 86 %
DLCOCOR-PRE: 16.6 ML/MIN/MMHG
DLCOUNC-%PRED-PRE: 88 %
DLCOUNC-PRE: 16.99 ML/MIN/MMHG
DLCOUNC-PRED: 19.24 ML/MIN/MMHG
ERV-%PRED-PRE: 51 %
ERV-PRE: 0.51 L
ERV-PRED: 0.99 L
EXPTIME-PRE: 6.51 SEC
FEF2575-%PRED-POST: 141 %
FEF2575-%PRED-PRE: 71 %
FEF2575-POST: 2.42 L/SEC
FEF2575-PRE: 1.22 L/SEC
FEF2575-PRED: 1.71 L/SEC
FEFMAX-%PRED-PRE: 54 %
FEFMAX-PRE: 3 L/SEC
FEFMAX-PRED: 5.55 L/SEC
FEV1-%PRED-PRE: 73 %
FEV1-PRE: 1.5 L
FEV1FEV6-PRE: 75 %
FEV1FEV6-PRED: 79 %
FEV1FVC-PRE: 75 %
FEV1FVC-PRED: 78 %
FEV1SVC-PRE: 69 %
FEV1SVC-PRED: 64 %
FIFMAX-PRE: 1.75 L/SEC
FRCPLETH-%PRED-PRE: 103 %
FRCPLETH-PRE: 2.87 L
FRCPLETH-PRED: 2.77 L
FVC-%PRED-PRE: 76 %
FVC-PRE: 2 L
FVC-PRED: 2.62 L
IC-%PRED-PRE: 84 %
IC-PRE: 1.67 L
IC-PRED: 1.98 L
RVPLETH-%PRED-PRE: 109 %
RVPLETH-PRE: 2.36 L
RVPLETH-PRED: 2.16 L
TLCPLETH-%PRED-PRE: 88 %
TLCPLETH-PRE: 4.53 L
TLCPLETH-PRED: 5.11 L
VA-%PRED-PRE: 77 %
VA-PRE: 3.61 L
VC-%PRED-PRE: 68 %
VC-PRE: 2.18 L
VC-PRED: 3.16 L

## 2024-02-10 PROBLEM — Z28.21 NO VACCINATION-PT REFUSE: Status: ACTIVE | Noted: 2024-02-10

## 2024-02-10 PROBLEM — Z53.9: Status: ACTIVE | Noted: 2024-02-10

## 2024-02-10 PROBLEM — Z53.20 COLON CANCER SCREENING DECLINED: Status: RESOLVED | Noted: 2024-02-10 | Resolved: 2024-02-10

## 2024-02-10 PROBLEM — Z53.20 COLON CANCER SCREENING DECLINED: Status: ACTIVE | Noted: 2024-02-10

## 2024-02-10 ASSESSMENT — ACTIVITIES OF DAILY LIVING (ADL): CURRENT_FUNCTION: NEEDS ASSISTANCE

## 2024-02-10 NOTE — PROGRESS NOTES
Preventive Care Visit  Formerly [facility]  Deo Love Mai, MD, Family Medicine  Feb 7, 2024      SUBJECTIVE:   Tiara is a 73 year old, presenting for the following:  Hypertension, Lipids, and Physical        2/7/2024    11:44 AM   Additional Questions   Roomed by Gwen VILLA MA     Are you in the first 12 months of your Medicare coverage?  No    HPI  Today's PHQ-9 Score:       2/7/2024    11:34 AM   PHQ-9 SCORE   PHQ-9 Total Score MyChart 3 (Minimal depression)   PHQ-9 Total Score 3     Hypertension: She presents for follow up of hypertension.  She does not check blood pressure  regularly outside of the clinic. Outpatient blood pressures have not been over 140/90. She does not follow a low salt diet.     Reason for visit:  Hypertension lipids    She eats 0-1 servings of fruits and vegetables daily.She consumes 0 sweetened beverage(s) daily.She exercises with enough effort to increase her heart rate 9 or less minutes per day.  She exercises with enough effort to increase her heart rate 3 or less days per week.   She is taking medications regularly.       Via the Health Maintenance questionnaire, the patient has reported the following services have been completed -Colonscopy, this information has been sent to the abstraction team.    Have you ever done Advance Care Planning? (For example, a Health Directive, POLST, or a discussion with a medical provider or your loved ones about your wishes): No, advance care planning information given to patient to review.  Advanced care planning was discussed at today's visit.       Fall risk  Fallen 2 or more times in the past year?: Yes  Cognitive Screening   1) Repeat 3 items (Leader, Season, Table)    2) Clock draw: NORMAL  3) 3 item recall: Recalls 2 objects   Results: NORMAL clock, 1-2 items recalled: COGNITIVE IMPAIRMENT LESS LIKELY    Mini-CogTM Copyright S Kylee. Licensed by the author for use in [facility]; reprinted with permission  (shahram@[facility]). All rights reserved.      Do you have sleep apnea, excessive snoring or daytime drowsiness? : no    Reviewed and updated as needed this visit by clinical staff   Tobacco  Allergies  Meds  Problems  Med Hx  Surg Hx  Fam Hx  Soc   Hx        Reviewed and updated as needed this visit by Provider   Tobacco  Allergies  Meds  Problems  Med Hx  Surg Hx  Fam Hx  Soc   Hx Sexual Activity        Social History     Tobacco Use    Smoking status: Every Day     Packs/day: 0.25     Years: 30.00     Additional pack years: 0.00     Total pack years: 7.50     Types: Cigarettes    Smokeless tobacco: Never   Substance Use Topics    Alcohol use: No     Comment: occasional             2/10/2024     3:21 PM   Alcohol Use   Prescreen: >3 drinks/day or >7 drinks/week? No     Do you have a current opioid prescription? No  Do you use any other controlled substances or medications that are not prescribed by a provider? None    Tiara is here today for physical and general follow-up.     1.   She takes Lisinopril 40 mg and hydrochlorothiazide 25 mg daily as prescribed with no side effect for her blood pressure.  Not checking her blood pressure at home.  No headache or dizziness.  No chest pain, shortness of breath, leg swelling, orthopnea or dyspnea.  Denies of excessive salt or caffeine intake.    2.  She had a stroke couple years ago and has been doing well since then.  No significant effect from the stroke.  Her blood pressure has been stable/normal.  She been taking the aspirin 81 mg daily and Lipitor 40 mg daily.  Not seen neurologist by choice.  No headache or dizziness.  No acute vision change.  No problem with her thought processes or speech.  No weakness.  No problem with taking medication.    3.  Been a smoker for over 50 years, used to smoke up to 2 packs/day.  Currently smokes about 1/4 pack/day.  Not ready to quit.  Never been evaluated for COPD.  Denies of breathing problem.  No dyspnea or orthopnea.   No coughing.  No wheezing.     4.  Her  is concerned about her snoring.  He believes that she stopped breathing an at times gasping for air while sleeping.  Patient does not believe that it is the case.  She takes nap daily.  No trouble with sleeping.     5.  Otherwise, she is doing well.  No major medical care or procedure done since the last physical couple years ago.  No running nose, nasal congestion, coughing, fever or chill. No CP/SOB. No N/V/D/C or problem with urination. No abdominal pain.  She is post-menopausal - no history of abnormal pap or STD.  Last pap was many years ago.  No leg swelling, orthopnea or dyspnea.  No joint or muscle pain.  No alcohol or drugs.  Feels safe at home - lives with her ; no concern of her safety or mental health.  No weight change and denied of being depressed. No other concern today    Current providers sharing in care for this patient include:   Patient Care Team:  Deo Arevalo MD as PCP - General (Family Medicine)  Dalia Mendoza MD as MD (Cardiovascular Disease)  Dalia Mendoza MD as Assigned Heart and Vascular Provider  Deo Arevalo MD as Assigned PCP    The following health maintenance items are reviewed in Epic and correct as of today:  Health Maintenance   Topic Date Due    URINALYSIS  Never done    ZOSTER IMMUNIZATION (1 of 2) Never done    MAMMO SCREENING  03/17/2007    RSV VACCINE (Pregnancy & 60+) (1 - 1-dose 60+ series) Never done    LUNG CANCER SCREENING  11/03/2021    Pneumococcal Vaccine: 65+ Years (2 of 2 - PCV) 11/24/2021    ANNUAL REVIEW OF HM ORDERS  03/21/2023    COLORECTAL CANCER SCREENING  07/20/2023    INFLUENZA VACCINE (1) Never done    COVID-19 Vaccine (4 - 2023-24 season) 09/01/2023    NICOTINE/TOBACCO CESSATION COUNSELING Q 1 YR  01/20/2024    MEDICARE ANNUAL WELLNESS VISIT  02/07/2025    BMP  02/07/2025    LIPID  02/07/2025    MICROALBUMIN  02/07/2025    FALL RISK ASSESSMENT  02/07/2025    HEMOGLOBIN  02/07/2025     GLUCOSE  02/07/2027    ADVANCE CARE PLANNING  07/05/2027    DTAP/TDAP/TD IMMUNIZATION (2 - Td or Tdap) 11/24/2030    DEXA  07/14/2037    HEPATITIS C SCREENING  Completed    PHQ-2 (once per calendar year)  Completed    IPV IMMUNIZATION  Aged Out    HPV IMMUNIZATION  Aged Out    MENINGITIS IMMUNIZATION  Aged Out    RSV MONOCLONAL ANTIBODY  Aged Out     BP Readings from Last 3 Encounters:   02/07/24 134/86   03/01/23 (!) 120/102   01/20/23 118/80    Wt Readings from Last 3 Encounters:   02/07/24 88.5 kg (195 lb)   03/01/23 88.5 kg (195 lb 3.2 oz)   01/20/23 87.3 kg (192 lb 6.4 oz)                  Patient Active Problem List   Diagnosis    HTN, goal below 130/80    Hyperlipidemia LDL goal <70    Advanced directives, counseling/discussion    History of CVA (cerebrovascular accident)    Tobacco use disorder    Chronic kidney disease, stage 3a (H)    Obesity    No vaccination-pt refuse    Colon cancer screening declined    Breast cancer screening not performed     Past Surgical History:   Procedure Laterality Date    HC EXCISION BREAST LESION W XRAY MARKER, OPEN SINGLE  3/25/2005    Left breast.    [facility] COLONOSCOPY W/WO BRUSH/WASH  3/29/2005       Social History     Tobacco Use    Smoking status: Every Day     Packs/day: 0.25     Years: 30.00     Additional pack years: 0.00     Total pack years: 7.50     Types: Cigarettes    Smokeless tobacco: Never   Substance Use Topics    Alcohol use: No     Comment: occasional     Family History   Problem Relation Age of Onset    Osteoporosis Mother     Neurologic Disorder Mother     Genitourinary Problems Mother     Depression Mother     Eye Disorder Mother     Hypertension Father     Cerebrovascular Disease Father     Alcohol/Drug Father     Allergies Father     Cancer Father         lung cancer - smoker    Lipids Father     Arthritis Father     Coronary Artery Disease Father     Unknown/Adopted Sister     Cancer Maternal Grandfather         melanoma    Gynecology Daughter     No  "Known Problems Son     No Known Problems Son     No Known Problems Son          Current Outpatient Medications   Medication Sig Dispense Refill    aspirin (ASA) 81 MG chewable tablet Take 1 tablet (81 mg) by mouth daily 30 tablet 1    atorvastatin (LIPITOR) 40 MG tablet Take 1 tablet (40 mg) by mouth every evening 90 tablet 0    hydrochlorothiazide (HYDRODIURIL) 25 MG tablet TAKE 1 TABLET BY MOUTH EVERY DAY 90 tablet 0    lisinopril (ZESTRIL) 40 MG tablet Take 1 tablet (40 mg) by mouth daily 30 tablet 0    tiotropium (SPIRIVA RESPIMAT) 2.5 MCG/ACT inhaler Inhale 2 puffs into the lungs daily 4 g 4     Allergies   Allergen Reactions    Codeine      Other Reaction(s): \"tongue felt fuzzy\"    Morphine And Related      thick tongue, diff. speaking to COLBYHN #3     Recent Labs   Lab Test 02/07/24  1229 03/08/23  0946 01/20/23  1501 09/22/22  1359 03/21/22  0943 02/04/21  0938 11/04/20  0631 11/03/20  1547   A1C  --   --   --   --   --   --   --  5.8*   LDL 57  --  65  --  51  --  104*  --    HDL 74  --  63  --  62  --  49*  --    TRIG 69  --  87  --  118  --  81  --    ALT 20 32 42*   < > 28 31 18  --    CR 1.03* 1.09* 1.05*   < > 1.15* 1.04 0.89  --    GFRESTIMATED 57* 54* 56*   < > 51* 54* 66  --    GFRESTBLACK  --   --   --   --   --  63 76  --    POTASSIUM 4.6 4.5 4.4   < > 4.4 3.9 3.8  --    TSH  --   --  1.70  --   --   --   --  1.68    < > = values in this interval not displayed.      She declined breast and cervical cancer screening      Review of Systems     Review of Systems  Constitutional, HEENT, cardiovascular, pulmonary, GI, , musculoskeletal, neuro, skin, endocrine and psych systems are negative, except as otherwise noted.    OBJECTIVE:   /86   Pulse 78   Temp 97.2  F (36.2  C) (Temporal)   Resp 18   Wt 88.5 kg (195 lb)   LMP  (LMP Unknown)   SpO2 95%   BMI 32.45 kg/m     Estimated body mass index is 32.45 kg/m  as calculated from the following:    Height as of 3/1/23: 1.651 m (5' 5\").    " Weight as of this encounter: 88.5 kg (195 lb).  Physical Exam  GENERAL: healthy, alert and no distress.  Speaking in full sentences and her speech was easily comprehensible.  EYES: Eyes grossly normal to inspection, PERRL and conjunctivae and sclerae normal.  No nystagmus.  All 4 visual fields intact.  HENT: ear canals and TM's normal.  Nares are non-congested. Oropharynx is pink and moist. No tender with palpation to the sinuses.   NECK: no adenopathy, supple, no lymphadenopathy or thyromegaly.  No tender with palpation to the cervical spine or its paraspinous muscle.  No JV distention or carotid bruits.  RESP: lungs clear to auscultation - no rales, rhonchi or wheezes.  Decreased breath sounds throughout.  Pursed lips breathing appreciated.  No clubbing nails.  BREAST: Offered and recommended but she declined.  CV: regular rate and rhythm, no murmur  ABDOMEN: soft, nontender, nondistended, no palpable masses organomegaly with normal culture.  No CVA tenderness.   (female): Offered and recommended but she declined.  MS: No gross musculoskeletal defects noted, no edema. All joints with limited range of motion but all 4 extremities were equally in strength. Back is straight, no tender with palpation to the spine.  Normal gait.  No focal weakness  SKIN: no suspicious lesions or rashes  NEURO: Normal strength and tone, mentation intact and speech normal.  Cranial nerve II through XII intact.  DTRs +2 throughout.  No focal neurological deficit.  PSYCH: mentation appears normal, affect normal/bright.  Thoughts intact, suicidal/homicidal ideation.       Diagnostic Test Results:  Labs reviewed in Epic  Results for orders placed or performed in visit on 02/07/24   Comprehensive metabolic panel (BMP + Alb, Alk Phos, ALT, AST, Total. Bili, TP)     Status: Abnormal   Result Value Ref Range    Sodium 126 (L) 135 - 145 mmol/L    Potassium 4.6 3.4 - 5.3 mmol/L    Carbon Dioxide (CO2) 24 22 - 29 mmol/L    Anion Gap 12 7 - 15  mmol/L    Urea Nitrogen 15.2 8.0 - 23.0 mg/dL    Creatinine 1.03 (H) 0.51 - 0.95 mg/dL    GFR Estimate 57 (L) >60 mL/min/1.73m2    Calcium 9.4 8.8 - 10.2 mg/dL    Chloride 90 (L) 98 - 107 mmol/L    Glucose 98 70 - 99 mg/dL    Alkaline Phosphatase 126 40 - 150 U/L    AST 35 0 - 45 U/L    ALT 20 0 - 50 U/L    Protein Total 7.5 6.4 - 8.3 g/dL    Albumin 4.3 3.5 - 5.2 g/dL    Bilirubin Total 0.8 <=1.2 mg/dL   Lipid panel reflex to direct LDL Fasting     Status: None   Result Value Ref Range    Cholesterol 145 <200 mg/dL    Triglycerides 69 <150 mg/dL    Direct Measure HDL 74 >=50 mg/dL    LDL Cholesterol Calculated 57 <=100 mg/dL    Non HDL Cholesterol 71 <130 mg/dL    Patient Fasting > 8hrs? Yes     Narrative    Cholesterol  Desirable:  <200 mg/dL    Triglycerides  Normal:  Less than 150 mg/dL  Borderline High:  150-199 mg/dL  High:  200-499 mg/dL  Very High:  Greater than or equal to 500 mg/dL    Direct Measure HDL  Female:  Greater than or equal to 50 mg/dL   Male:  Greater than or equal to 40 mg/dL    LDL Cholesterol  Desirable:  <100mg/dL  Above Desirable:  100-129 mg/dL   Borderline High:  130-159 mg/dL   High:  160-189 mg/dL   Very High:  >= 190 mg/dL    Non HDL Cholesterol  Desirable:  130 mg/dL  Above Desirable:  130-159 mg/dL  Borderline High:  160-189 mg/dL  High:  190-219 mg/dL  Very High:  Greater than or equal to 220 mg/dL   CBC with platelets     Status: Normal   Result Value Ref Range    WBC Count 5.9 4.0 - 11.0 10e3/uL    RBC Count 4.83 3.80 - 5.20 10e6/uL    Hemoglobin 14.2 11.7 - 15.7 g/dL    Hematocrit 42.4 35.0 - 47.0 %    MCV 88 78 - 100 fL    MCH 29.4 26.5 - 33.0 pg    MCHC 33.5 31.5 - 36.5 g/dL    RDW 13.5 10.0 - 15.0 %    Platelet Count 308 150 - 450 10e3/uL   Albumin Random Urine Quantitative with Creat Ratio     Status: None   Result Value Ref Range    Creatinine Urine mg/dL 76.9 mg/dL    Albumin Urine mg/L <12.0 mg/L    Albumin Urine mg/g Cr         ASSESSMENT / PLAN:   (Z00.00) Encounter for  Medicare annual wellness exam  (primary encounter diagnosis)  Comment: Overall Tiara is doing well, her chronic medical condition stable.  Discussed about safety issue, healthy diet, exercising, weight management, sleeping hygiene, advanced directive care, falling prevention, and depression prevention. Recommended daily exercise for at least 30 minutes or staying active as possible.  Emphasized on healthy diet with adequate fluid intake and resting.  No safety or mental health concerns.  Follow in 1 year for physical, earlier as needed.  Labs as ordered and results reviewed.  All of her questions were answered.    Immunization: She declined all vaccination today.  She is due for COVID booster, influenza, Pneumovax, RSV and shingle - they were again offered and recommended but she declined.  Risk and benefit discussed and she will take the risk.    Colon cancer screening: Last colonoscopy was in 2005 and was normal.  FIT in 2021 and 2022 were normal.  Discussed with her about options for colon cancer screening which include colonoscopy, Cologuard or FIT test.  Pros and cons of each option discussed.  She preferred Cologuard and therefore it was ordered.  Discussed about potential for was positive test which will likely require further evaluation with colonoscopy.  She is willing to take the risk.  She also informed that normal Cologuard to be repeated every 3 years.     Breast cancer screening: Never had a mammogram done before.  Discussed with her about the importance of breast ankle screening and inform her early detection of breast cancer can be curable.  She is again declined today and is willing to take the risk.  She was encouraged to let me know if she change her mind about it.  Offered breast exam today as well but she declined.    Cervical cancer screening: No longer indicated due to her age.  However she never been screened before by choice.  I offered a catch-up Pap smear today but she declined.    Bone  density: DEXA scan in 2022 showed mild osteopenia.  Foods with high source of vitamin D and calcium discussed and encouraged.    Plan: Comprehensive metabolic panel (BMP + Alb, Alk         Phos, ALT, AST, Total. Bili, TP), Lipid panel         reflex to direct LDL Fasting, CBC with         platelets            (I10) HTN, goal below 130/80  Comment: Also has high cholesterol and history of stroke.  The goal for her blood pressure to be less than 130/80.  Blood pressure has been to stable and normal with the lisinopril and hydrochlorothiazide.  Tolerating medication well, no side effect.  Blood pressure is normal today.  Kidney function electrolytes today were also normal.  Emphasized on exercising, healthy diet and weight management as discussed above.  Will continue with the lisinopril and hydrochlorothiazide.  Also encouraged to avoid high caffeine/salt intake.  Follow-up in 6 months.    Plan: Comprehensive metabolic panel (BMP + Alb, Alk         Phos, ALT, AST, Total. Bili, TP), Albumin         Random Urine Quantitative with Creat Ratio            (E78.5) Hyperlipidemia LDL goal <70  Comment: Also has high blood pressure and history of stroke.  The goal for his LDL to be less than 70.  Tolerating the Lipitor well.  Will need to be on the statin for life unless contraindication arise due to history of stroke.  No history liver disease.  Liver enzymes today was normal.  No excessive alcohol intake.  Will continue with the Lipitor 40 mg.  Recheck the level in a year.    Plan: Comprehensive metabolic panel (BMP + Alb, Alk         Phos, ALT, AST, Total. Bili, TP), Lipid panel         reflex to direct LDL Fasting            (Z86.73) History of CVA (cerebrovascular accident)  Comment: Overall stable.  Emphasized importance of controlling all modifiable risk.  Both blood pressure and cholesterol are controlled.  She does not have diabetes.  She is strongly encouraged to stop smoking.  Also encouraged to work on exercising  and healthy diet for weight control.  Will continue with the Lipitor and aspirin.  Again, she will need to be on statin for life.  Symptoms that need to be seen or call in discussed.      (N18.31) Chronic kidney disease, stage 3a (H)  Comment: Medication reviewed.  Overall stable.  Blood pressure is controlled.  No diabetes.  Encouraged to drink a lot of water.  Will continue to monitor closely.    Plan: Comprehensive metabolic panel (BMP + Alb, Alk         Phos, ALT, AST, Total. Bili, TP), CBC with         platelets, Albumin Random Urine Quantitative         with Creat Ratio            (E66.09,  Z68.32) Class 1 obesity due to excess calories with serious comorbidity and body mass index (BMI) of 32.0 to 32.9 in adult  Comment: Today's BMI was 32.  He also has history of stroke, high blood pressure and high cholesterol.  Discussed about its potential long and short-term complications.  Also educated herabout the goal for her weight and BMI.  Exercising and healthy/portioned diet discussed and encouraged.  No history of thyroid problem.       (R06.83) Snoring  Comment: She is obese.  Also has high blood pressure, high cholesterol and history of stroke.   believes that she stopped breathing and gasping for air while sleeping.  She take naps during the day.  I am concerned of sleep apnea.  She was referred for sleep study.  Again, encouraged to work on weight loss as mentioned above.  Also discussed with her about the potential complication associated with chronic uncontrolled sleep apnea.    Plan: Adult Sleep Eval & Management          Referral            (F17.200) Tobacco use disorder  Comment: Been a smoker for many years; up to 2 pack a day.  Still smoking about 1/4 pack per day.  Not ready to quit.  I had a long conversation with her about the potential complication associate with tobacco smoking especially with history of stroke.  Smoking cessation aid discussed as well.  She again declined.    I  "am also concerned of COPD.  Seen was pursed lip breathing comfortably while in the office.  I do not appreciate clubbed fingers.  Will send her for pulmonary function test.  Also started on Spiriva.  Symptoms that need to be since or call in also discussed.    Discussed about lung cancer screening and she also declined.    Plan: General PFT Lab (Please always keep checked),         Pulmonary Function Test, tiotropium (SPIRIVA         RESPIMAT) 2.5 MCG/ACT inhaler            (Z12.11) Colon cancer screening  Comment: Please see 1 above for further details.  Plan: COLOGUARD([employer])            (Z28.21) No vaccination-pt refuse  Comment: Please see 1 above for further details.      (Z53.9) Breast cancer screening not performed  Comment: Please see 1 above for further details.    Patient has been advised of split billing requirements and indicates understanding: Yes      Counseling  Reviewed preventive health counseling, as reflected in patient instructions       Regular exercise       Healthy diet/nutrition       Vision screening       Hearing screening       Dental care       Bladder control       Fall risk prevention       Immunizations  Declined: all due to Conscientious objector             Aspirin prophylaxis        Colon cancer screening       Advanced Planning       BMI  Estimated body mass index is 32.45 kg/m  as calculated from the following:    Height as of 3/1/23: 1.651 m (5' 5\").    Weight as of this encounter: 88.5 kg (195 lb).   Weight management plan: Discussed healthy diet and exercise guidelines      She reports that she has been smoking cigarettes. She has a 7.5 pack-year smoking history. She has never used smokeless tobacco.  Nicotine/Tobacco Cessation Plan  Information offered: Patient not interested at this time      Appropriate preventive services were discussed with this patient, including applicable screening as appropriate for fall prevention, nutrition, physical activity, Tobacco-use " cessation, weight loss and cognition.  Checklist reviewing preventive services available has been given to the patient.    Reviewed patients plan of care and provided an AVS. The Basic Care Plan (routine screening as documented in Health Maintenance) for Tiara meets the Care Plan requirement. This Care Plan has been established and reviewed with the Patient and spouse.          Signed Electronically by: Deo Love Mai, MD    Identified Health Risks  I have reviewed Opioid Use Disorder and Substance Use Disorder risk factors and made any needed referrals. Lung Cancer Screening Shared Decision Making Visit     Tiara Smith, a 73 year old female, is eligible for lung cancer screening    History   Smoking Status    Every Day    Packs/day: 0.25    Years: 30.00    Types: Cigarettes   Smokeless Tobacco    Never       I have discussed with patient the risks and benefits of screening for lung cancer with low-dose CT.     The risks include:    radiation exposure: one low dose chest CT has as much ionizing radiation as about 15 chest x-rays, or 6 months of background radiation living in [place]      false positives: most findings/nodules are NOT cancer, but some might still require additional diagnostic evaluation, including biopsy    over-diagnosis: some slow growing cancers that might never have been clinically significant will be detected and treated unnecessarily     The benefit of early detection of lung cancer is contingent upon adherence to annual screening or more frequent follow up if indicated.     Furthermore, to benefit from screening, Tiara must be willing and able to undergo diagnostic procedures, if indicated. Although no specific guide is available for determining severity of comorbidities, it is reasonable to withhold screening in patients who have greater mortality risk from other diseases.     We did discuss that the best way to prevent lung cancer is to not smoke.    Some patients may value a  numeric estimation of lung cancer risk when evaluating if lung cancer screening is right for them, here is one calculator:    ShouldIScreen    Tiara Smith has declined screening at this time

## 2024-02-12 ENCOUNTER — OFFICE VISIT (OUTPATIENT)
Dept: FAMILY MEDICINE | Facility: CLINIC | Age: 74
End: 2024-02-12
Payer: COMMERCIAL

## 2024-02-12 VITALS — BODY MASS INDEX: 32.45 KG/M2 | OXYGEN SATURATION: 98 % | HEIGHT: 65 IN | RESPIRATION RATE: 22 BRPM

## 2024-02-12 DIAGNOSIS — E87.1 HYPONATREMIA: ICD-10-CM

## 2024-02-12 DIAGNOSIS — N18.31 CHRONIC KIDNEY DISEASE, STAGE 3A (H): Primary | ICD-10-CM

## 2024-02-12 DIAGNOSIS — Z87.891 PERSONAL HISTORY OF TOBACCO USE: ICD-10-CM

## 2024-02-12 DIAGNOSIS — I10 HTN, GOAL BELOW 130/80: ICD-10-CM

## 2024-02-12 PROCEDURE — G0296 VISIT TO DETERM LDCT ELIG: HCPCS | Performed by: FAMILY MEDICINE

## 2024-02-12 PROCEDURE — 99214 OFFICE O/P EST MOD 30 MIN: CPT | Mod: 25 | Performed by: FAMILY MEDICINE

## 2024-02-12 RX ORDER — RESPIRATORY SYNCYTIAL VIRUS VACCINE 120MCG/0.5
0.5 KIT INTRAMUSCULAR ONCE
Qty: 1 EACH | Refills: 0 | Status: CANCELLED | OUTPATIENT
Start: 2024-02-12 | End: 2024-02-12

## 2024-02-12 NOTE — PROGRESS NOTES
Assessment & Plan     (E87.1) Hyponatremia  Comment: Patient had low sodium of 126 on 2/7/24, this appears to be a chronic issue since 2022. Not drinking water but instead drinking about 8 cups of coffee daily. Follows low to no salt salt diet. Currently on hydrochlorothiazide 25 mg and lisinopril 40 mg daily for her hypertension.  Likely chronic as she is not symptomatic with a sodium of 126.  Discussed about the potential complication associated with hyponatremia.  Informed her that it is likely due to her medication specifically with hydrochlorothiazide and lisinopril as well as sodium in her diet. Recommended she stop the hydrochlorothiazide, follow a regular diet with normal levels of sodium, and to follow up in 10-14 days for repeat BMP and blood pressure check. Also recommended she complete a lung CT to screen for lung cancer given her extensive tobacco history.  Patient in agreement with the plan.     Plan: Basic metabolic panel  (Ca, Cl, CO2, Creat,         Gluc, K, Na, BUN)    (I10) HTN, goal below 130/80  Comment: Blood pressure was 134/86 on 2/7/24 and 148/86 in office today. Discussed she may need to start a new blood pressure medication with the discontinuation of her hydrochlorothiazide. Will recheck blood pressure in 10-14 days and reassess at that time.     Plan: Basic metabolic panel  (Ca, Cl, CO2, Creat,         Gluc, K, Na, BUN)    (N18.31) Chronic kidney disease, stage 3a (H)  (primary encounter diagnosis)  Comment: Overall stable. Creatinine near baseline at 1.03 on 2/7/24. GFR slighlty reduced at 57, unchanged over the past year. No diabetes. Encouraged to increased her water intake. Will continue to monitor closely.     Plan: UA Macroscopic with reflex to Microscopic and         Culture, Basic metabolic panel  (Ca, Cl, CO2,         Creat, Gluc, K, Na, BUN      She continues to refuse all vaccination which included shingle, RSV, Pneumovax, COVID booster and influenza.  She also declined a  "mammogram or any form of breast cancer screening.    Misti Menjivar is a 73 year old, presenting for the following health issues:  Labs Only        2/12/2024     2:09 PM   Additional Questions   Roomed by Iain     Via the Shots Maintenance questionnaire, the patient has reported the following services have been completed -Colonscopy, this information has been sent to the abstraction team.    HPI     73 year old female with history of HTN on hydrochlorothiazide 25 mg and lisinopril 40 mg, hyperlipidemia, previous stroke, CKD stage 3a, and obesity who was seen on 2/7/24 for her annual wellness exam and found to have a sodium level of 126. She does not drink any water throughout the day, only sips water when taking her medications and drinks 8 cups of coffee every day. She follows a low salt diet. No weakness, fatigue, dizziness, or headaches.  No weakness.  Smokes mostly 2 pack/day for about 50 years; been smoking about 0.25 pack per day in the last for 5 years after having a stroke.  Not ready to quit smoking at this time.    Review of Systems  Constitutional, HEENT, cardiovascular, pulmonary, gi and gu systems are negative, except as otherwise noted.      Objective    Resp 22   Ht 1.651 m (5' 5\")   LMP  (LMP Unknown)   SpO2 98%   BMI 32.45 kg/m    Body mass index is 32.45 kg/m .  Physical Exam   GENERAL: alert and no distress, speaking full sentences.  RESP: lungs clear to auscultation - no rales, rhonchi or wheezes  CV: regular rate and rhythm, normal S1 S2, no S3 or S4, no murmur, click or rub, mild bilateral peripheral edema at ankles  MS: no gross musculoskeletal defects noted.  No focal weakness.  NEURO: Normal strength and tone, mentation intact and speech normal.  No focal neurological deficit.    No results found for this or any previous visit (from the past 24 hour(s)).        No results found for any visits on 02/12/24.     Tia Deras MS3    I was present with the medical student who " participated in the service and in the documentation of the note. I have verified the history and personally performed the physical exam and medical decision making. I reviewed the note in detail and edited it appropriately. I agree with the assessment and plan of care as documented in the note.     Signed Electronically by: Gali Vale [facility] Cancer Screening Shared Decision Making Visit     Tiara Smith, a 73 year old female, is eligible for lung cancer screening    History   Smoking Status    Every Day    Packs/day: 0.25    Types: Cigarettes   Smokeless Tobacco    Never         I have discussed with patient the risks and benefits of screening for lung cancer with low-dose CT.     The risks include:    radiation exposure: one low dose chest CT has as much ionizing radiation as about 15 chest x-rays, or 6 months of background radiation living in [place]      false positives: most findings/nodules are NOT cancer, but some might still require additional diagnostic evaluation, including biopsy    over-diagnosis: some slow growing cancers that might never have been clinically significant will be detected and treated unnecessarily     The benefit of early detection of lung cancer is contingent upon adherence to annual screening or more frequent follow up if indicated.     Furthermore, to benefit from screening, Tiara must be willing and able to undergo diagnostic procedures, if indicated. Although no specific guide is available for determining severity of comorbidities, it is reasonable to withhold screening in patients who have greater mortality risk from other diseases.     We did discuss that the best way to prevent lung cancer is to not smoke.    Some patients may value a numeric estimation of lung cancer risk when evaluating if lung cancer screening is right for them, here is one calculator:    ShouldIScreen

## 2024-02-12 NOTE — PATIENT INSTRUCTIONS
Hold the hydrochlorothiazide and the inhaler  Take other meds as usual  Lab and blood pressure check in 10 days  Will send you for chest CT to screen for lung cancer  Regular diet - no low salt diet    Lung Cancer Screening   Frequently Asked Questions  If you are at high-risk for lung cancer, getting screened with low-dose computed tomography (LDCT) every year can help save your life. This handout offers answers to some of the most common questions about lung cancer screening. If you have other questions, please call [phone] ([phone]).     What is it?  Lung cancer screening uses special X-ray technology to create an image of your lung tissue. The exam is quick and easy and takes less than 10 seconds. We don t give you any medicine or use any needles. You can eat before and after the exam. You don t need to change your clothes as long as the clothing on your chest doesn t contain metal. But, you do need to be able to hold your breath for at least 6 seconds during the exam.    What is the goal of lung cancer screening?  The goal of lung cancer screening is to save lives. Many times, lung cancer is not found until a person starts having physical symptoms. Lung cancer screening can help detect lung cancer in the earliest stages when it may be easier to treat.    Who should be screened for lung cancer?  We suggest lung cancer screening for anyone who is at high-risk for lung cancer. You are in the high-risk group if you:      are between the ages of 55 and 79, and    have smoked at least 1 pack of cigarettes a day for 20 or more years, and    still smoke or have quit within the past 15 years.    However, if you have a new cough or shortness of breath, you should talk to your doctor before being screened.    Why does it matter if I have symptoms?  Certain symptoms can be a sign that you have a condition in your lungs that should be checked and treated by your doctor. These symptoms include fever, chest  pain, a new or changing cough, shortness of breath that you have never felt before, coughing up blood or unexplained weight loss. Having any of these symptoms can greatly affect the results of lung cancer screening.       Should all smokers get an LDCT lung cancer screening exam?  It depends. Lung cancer screening is for a very specific group of men and women who have a history of heavy smoking over a long period of time (see  Who should be screened for lung cancer  above).  I am in the high-risk group, but have been diagnosed with cancer in the past. Is LDCT lung cancer screening right for me?  In some cases, you should not have LDCT lung screening, such as when your doctor is already following your cancer with CT scan studies. Your doctor will help you decide if LDCT lung screening is right for you.  Do I need to have a screening exam every year?  Yes. If you are in the high-risk group described earlier, you should get an LDCT lung cancer screening exam every year until you are 79, or are no longer willing or able to undergo screening and possible procedures to diagnose and treat lung cancer.  How effective is LDCT at preventing death from lung cancer?  Studies have shown that LDCT lung cancer screening can lower the risk of death from lung cancer by 20 percent in people who are at high-risk.  What are the risks?  There are some risks and limitations of LDCT lung cancer screening. We want to make sure you understand the risks and benefits, so please let us know if you have any questions. Your doctor may want to talk with you more about these risks.    Radiation exposure: As with any exam that uses radiation, there is a very small increased risk of cancer. The amount of radiation in LDCT is small--about the same amount a person would get from a mammogram. Your doctor orders the exam when he or she feels the potential benefits outweigh the risks.    False negatives: No test is perfect, including LDCT. It is possible  that you may have a medical condition, including lung cancer, that is not found during your exam. This is called a false negative result.    False positives and more testing: LDCT very often finds something in the lung that could be cancer, but in fact is not. This is called a false positive result. False positive tests often cause anxiety. To make sure these findings are not cancer, you may need to have more tests. These tests will be done only if you give us permission. Sometimes patients need a treatment that can have side effects, such as a biopsy. For more information on false positives, see  What can I expect from the results?     Findings not related to lung cancer: Your LDCT exam also takes pictures of areas of your body next to your lungs. In a very small number of cases, the CT scan will show an abnormal finding in one of these areas, such as your kidneys, adrenal glands, liver or thyroid. This finding may not be serious, but you may need more tests. Your doctor can help you decide what other tests you may need, if any.  What can I expect from the results?  About 1 out of 4 LDCT exams will find something that may need more tests. Most of the time, these findings are lung nodules. Lung nodules are very small collections of tissue in the lung. These nodules are very common, and the vast majority--more than 97 percent--are not cancer (benign). Most are normal lymph nodes or small areas of scarring from past infections.  But, if a small lung nodule is found to be cancer, the cancer can be cured more than 90 percent of the time. To know if the nodule is cancer, we may need to get more images before your next yearly screening exam. If the nodule has suspicious features (for example, it is large, has an odd shape or grows over time), we will refer you to a specialist for further testing.  Will my doctor also get the results?  Yes. Your doctor will get a copy of your results.  Is it okay to keep smoking now that  there s a cancer screening exam?  No. Tobacco is one of the strongest cancer-causing agents. It causes not only lung cancer, but other cancers and cardiovascular (heart) diseases as well. The damage caused by smoking builds over time. This means that the longer you smoke, the higher your risk of disease. While it is never too late to quit, the sooner you quit, the better.  Where can I find help to quit smoking?  The best way to prevent lung cancer is to stop smoking. If you have already quit smoking, congratulations and keep it up! For help on quitting smoking, please call [employer] at 4-309-QUIT-NOW (1-265.128.6610) or the American [employer] at 1-884.739.3272 to find local resources near you.  One-on-one health coaching:  If you d prefer to work individually with a health care provider on tobacco cessation, we offer:      Medication Therapy Management:  Our specially trained pharmacists work closely with you and your doctor to help you quit smoking.  Call [phone] or [phone] (toll free).

## 2024-02-12 NOTE — Clinical Note
Please have her stop by for blood pressure check and get the lab work done this week. W Plasty Text: The lesion was extirpated to the level of the fat with a #15 scalpel blade.  Given the location of the defect, shape of the defect and the proximity to free margins a W-plasty was deemed most appropriate for repair.  Using a sterile surgical marker, the appropriate transposition arms of the W-plasty were drawn incorporating the defect and placing the expected incisions within the relaxed skin tension lines where possible.    The area thus outlined was incised deep to adipose tissue with a #15 scalpel blade.  The skin margins were undermined to an appropriate distance in all directions utilizing iris scissors.  The opposing transposition arms were then transposed into place in opposite direction and anchored with interrupted buried subcutaneous sutures.

## 2024-02-24 LAB — NONINV COLON CA DNA+OCC BLD SCRN STL QL: NEGATIVE

## 2024-02-26 ENCOUNTER — TELEPHONE (OUTPATIENT)
Dept: FAMILY MEDICINE | Facility: CLINIC | Age: 74
End: 2024-02-26
Payer: COMMERCIAL

## 2024-02-26 NOTE — TELEPHONE ENCOUNTER
Deo Arevalo MD  [facility]  Please have her stop by for blood pressure check and get the lab work done this week.    Patient notified and appt made  Missy Mcneal MA 2/26/2024

## 2024-02-28 ENCOUNTER — LAB (OUTPATIENT)
Dept: LAB | Facility: CLINIC | Age: 74
End: 2024-02-28
Payer: COMMERCIAL

## 2024-02-28 ENCOUNTER — ALLIED HEALTH/NURSE VISIT (OUTPATIENT)
Dept: FAMILY MEDICINE | Facility: CLINIC | Age: 74
End: 2024-02-28
Payer: COMMERCIAL

## 2024-02-28 VITALS — DIASTOLIC BLOOD PRESSURE: 80 MMHG | SYSTOLIC BLOOD PRESSURE: 150 MMHG

## 2024-02-28 DIAGNOSIS — I10 HTN, GOAL BELOW 130/80: ICD-10-CM

## 2024-02-28 DIAGNOSIS — I10 HTN, GOAL BELOW 130/80: Primary | ICD-10-CM

## 2024-02-28 DIAGNOSIS — E87.1 HYPONATREMIA: ICD-10-CM

## 2024-02-28 DIAGNOSIS — N18.31 CHRONIC KIDNEY DISEASE, STAGE 3A (H): ICD-10-CM

## 2024-02-28 LAB
ALBUMIN UR-MCNC: NEGATIVE MG/DL
ANION GAP SERPL CALCULATED.3IONS-SCNC: 10 MMOL/L (ref 7–15)
APPEARANCE UR: ABNORMAL
BACTERIA #/AREA URNS HPF: ABNORMAL /HPF
BILIRUB UR QL STRIP: NEGATIVE
BUN SERPL-MCNC: 16.8 MG/DL (ref 8–23)
CALCIUM SERPL-MCNC: 9.3 MG/DL (ref 8.8–10.2)
CHLORIDE SERPL-SCNC: 103 MMOL/L (ref 98–107)
COLOR UR AUTO: YELLOW
CREAT SERPL-MCNC: 1.25 MG/DL (ref 0.51–0.95)
DEPRECATED HCO3 PLAS-SCNC: 25 MMOL/L (ref 22–29)
EGFRCR SERPLBLD CKD-EPI 2021: 45 ML/MIN/1.73M2
GLUCOSE SERPL-MCNC: 91 MG/DL (ref 70–99)
GLUCOSE UR STRIP-MCNC: NEGATIVE MG/DL
HGB UR QL STRIP: NEGATIVE
KETONES UR STRIP-MCNC: 5 MG/DL
LEUKOCYTE ESTERASE UR QL STRIP: ABNORMAL
NITRATE UR QL: NEGATIVE
PH UR STRIP: 5 [PH] (ref 5–7)
POTASSIUM SERPL-SCNC: 4.9 MMOL/L (ref 3.4–5.3)
RBC URINE: 1 /HPF
SODIUM SERPL-SCNC: 138 MMOL/L (ref 135–145)
SP GR UR STRIP: 1.02 (ref 1–1.03)
SQUAMOUS EPITHELIAL: 2 /HPF
UROBILINOGEN UR STRIP-MCNC: 2 MG/DL
WBC URINE: 2 /HPF

## 2024-02-28 PROCEDURE — 36415 COLL VENOUS BLD VENIPUNCTURE: CPT

## 2024-02-28 PROCEDURE — 81001 URINALYSIS AUTO W/SCOPE: CPT

## 2024-02-28 PROCEDURE — 99207 PR NO CHARGE NURSE ONLY: CPT

## 2024-02-28 PROCEDURE — 80048 BASIC METABOLIC PNL TOTAL CA: CPT

## 2024-02-28 RX ORDER — AMLODIPINE BESYLATE 5 MG/1
5 TABLET ORAL DAILY
Qty: 30 TABLET | Refills: 0 | Status: SHIPPED | OUTPATIENT
Start: 2024-02-28 | End: 2024-03-26

## 2024-02-28 NOTE — PROGRESS NOTES
Tiara Smith is a 73 year old patient who comes in today for a Blood Pressure check.  Initial BP:  BP (!) 150/80   LMP  (LMP Unknown)      Data Unavailable  Disposition: results routed to provider and BP elevated.  Triage RN notified, patient asked to wait patient is asymptomatic. She will wait to hear on next steps.  Missy Mcneal MA 2/28/2024

## 2024-02-29 ENCOUNTER — TELEPHONE (OUTPATIENT)
Dept: FAMILY MEDICINE | Facility: CLINIC | Age: 74
End: 2024-02-29
Payer: COMMERCIAL

## 2024-02-29 DIAGNOSIS — I10 PRIMARY HYPERTENSION: Primary | ICD-10-CM

## 2024-02-29 RX ORDER — LISINOPRIL 40 MG/1
40 TABLET ORAL DAILY
Qty: 90 TABLET | Refills: 0 | Status: SHIPPED | OUTPATIENT
Start: 2024-02-29 | End: 2024-06-21

## 2024-02-29 NOTE — TELEPHONE ENCOUNTER
----- Message from Elin Emery CMA sent at 2/29/2024  8:42 AM CST -----    ----- Message -----  From: Deo Arevalo MD  Sent: 2/29/2024   8:11 AM CST  To: Britt Jimenez    Please let patient know that her urine looked good.  No bladder infection.  Her kidney function remained to be slightly elevated, slightly worse as compared to 3 weeks ago.  Her blood pressure remains to be high which certainly can affect her kidney function.  Medication adjusted.  Recommend to recheck the kidney function in 2 to 3 weeks.  Also be sure to drink a lot of water.  Thank you

## 2024-02-29 NOTE — PROGRESS NOTES
Please let patient know that her blood pressure remains to be high.  Will continue with the lisinopril and hydrochlorothiazide.  Will adding medication called amlodipine, take it daily as prescribed.  She will now takes 3 different medications for high blood pressure.  Recheck the blood pressure again in a 10-14 days.

## 2024-02-29 NOTE — PROGRESS NOTES
Spoke with patient and got consent to speak with spouse. Informed of note below and blood pressure appointment made.     Closing encounter.   Gricelda Larson MA

## 2024-03-07 ENCOUNTER — HOSPITAL ENCOUNTER (OUTPATIENT)
Dept: CT IMAGING | Facility: CLINIC | Age: 74
Discharge: HOME | End: 2024-03-07
Attending: FAMILY MEDICINE | Admitting: FAMILY MEDICINE
Payer: COMMERCIAL

## 2024-03-07 ENCOUNTER — TELEPHONE (OUTPATIENT)
Dept: FAMILY MEDICINE | Facility: CLINIC | Age: 74
End: 2024-03-07
Payer: COMMERCIAL

## 2024-03-07 DIAGNOSIS — Z87.891 PERSONAL HISTORY OF TOBACCO USE: ICD-10-CM

## 2024-03-07 PROCEDURE — 71271 CT THORAX LUNG CANCER SCR C-: CPT

## 2024-03-07 NOTE — TELEPHONE ENCOUNTER
----- Message from Deo Love Mai, MD sent at 3/7/2024  2:03 PM CST -----  Please let patient know that her CT scan showed benign looking nodule, recommended to repeat in a year.  Deo

## 2024-03-13 ENCOUNTER — ALLIED HEALTH/NURSE VISIT (OUTPATIENT)
Dept: FAMILY MEDICINE | Facility: CLINIC | Age: 74
End: 2024-03-13
Payer: COMMERCIAL

## 2024-03-13 VITALS — DIASTOLIC BLOOD PRESSURE: 82 MMHG | SYSTOLIC BLOOD PRESSURE: 130 MMHG

## 2024-03-13 DIAGNOSIS — I10 PRIMARY HYPERTENSION: Primary | ICD-10-CM

## 2024-03-13 DIAGNOSIS — I63.519 CEREBROVASCULAR ACCIDENT (CVA) DUE TO OCCLUSION OF MIDDLE CEREBRAL ARTERY, UNSPECIFIED BLOOD VESSEL LATERALITY (H): ICD-10-CM

## 2024-03-13 PROCEDURE — 99207 PR NO CHARGE NURSE ONLY: CPT

## 2024-03-13 NOTE — PROGRESS NOTES
Patient informed, blood pressure reading looks good, no changes to medication.  Krissy Bolanos MA

## 2024-03-13 NOTE — PROGRESS NOTES
Tiara Smith is a 73 year old patient who comes in today for a Blood Pressure check.  Initial BP:  /82   LMP  (LMP Unknown)      Data Unavailable  Disposition: follow-up as previously indicated by provider and results routed to provider    Missy Mcneal MA 3/13/2024

## 2024-03-13 NOTE — PROGRESS NOTES
Please let patient know that her blood pressure is now therapeutic.  No change in medication.  Thank you

## 2024-03-14 RX ORDER — ATORVASTATIN CALCIUM 40 MG/1
40 TABLET, FILM COATED ORAL EVERY EVENING
Qty: 90 TABLET | Refills: 2 | Status: SHIPPED | OUTPATIENT
Start: 2024-03-14

## 2024-03-21 ENCOUNTER — LAB (OUTPATIENT)
Dept: LAB | Facility: CLINIC | Age: 74
End: 2024-03-21
Payer: COMMERCIAL

## 2024-03-21 DIAGNOSIS — I10 PRIMARY HYPERTENSION: ICD-10-CM

## 2024-03-21 LAB
ANION GAP SERPL CALCULATED.3IONS-SCNC: 12 MMOL/L (ref 7–15)
BUN SERPL-MCNC: 22 MG/DL (ref 8–23)
CALCIUM SERPL-MCNC: 9.6 MG/DL (ref 8.8–10.2)
CHLORIDE SERPL-SCNC: 100 MMOL/L (ref 98–107)
CREAT SERPL-MCNC: 1.11 MG/DL (ref 0.51–0.95)
DEPRECATED HCO3 PLAS-SCNC: 25 MMOL/L (ref 22–29)
EGFRCR SERPLBLD CKD-EPI 2021: 52 ML/MIN/1.73M2
GLUCOSE SERPL-MCNC: 85 MG/DL (ref 70–99)
POTASSIUM SERPL-SCNC: 4.8 MMOL/L (ref 3.4–5.3)
SODIUM SERPL-SCNC: 137 MMOL/L (ref 135–145)

## 2024-03-21 PROCEDURE — 80048 BASIC METABOLIC PNL TOTAL CA: CPT

## 2024-03-21 PROCEDURE — 36415 COLL VENOUS BLD VENIPUNCTURE: CPT

## 2024-03-26 DIAGNOSIS — I10 HTN, GOAL BELOW 130/80: ICD-10-CM

## 2024-03-26 RX ORDER — AMLODIPINE BESYLATE 5 MG/1
5 TABLET ORAL DAILY
Qty: 90 TABLET | Refills: 1 | Status: SHIPPED | OUTPATIENT
Start: 2024-03-26 | End: 2024-09-23

## 2024-04-04 ENCOUNTER — TELEPHONE (OUTPATIENT)
Dept: FAMILY MEDICINE | Facility: CLINIC | Age: 74
End: 2024-04-04
Payer: COMMERCIAL

## 2024-04-04 DIAGNOSIS — H91.93 DECREASED HEARING OF BOTH EARS: Primary | ICD-10-CM

## 2024-04-04 NOTE — TELEPHONE ENCOUNTER
I am writing this because you wanted me to.  She is having hearing concerns that need to be addressed.   Gwen Villanueva MA

## 2024-05-01 ENCOUNTER — TELEPHONE (OUTPATIENT)
Dept: FAMILY MEDICINE | Facility: CLINIC | Age: 74
End: 2024-05-01
Payer: COMMERCIAL

## 2024-05-01 NOTE — TELEPHONE ENCOUNTER
Patient Quality Outreach    Patient is due for the following:   Breast Cancer Screening - Mammogram    Next Steps:   Schedule a office visit for mammogram    Type of outreach:    Sent letter.      Questions for provider review:    None           Krissy Bolanos MA

## 2024-05-28 DIAGNOSIS — I63.519 CEREBROVASCULAR ACCIDENT (CVA) DUE TO OCCLUSION OF MIDDLE CEREBRAL ARTERY, UNSPECIFIED BLOOD VESSEL LATERALITY (H): ICD-10-CM

## 2024-05-28 DIAGNOSIS — I10 HTN, GOAL BELOW 130/80: ICD-10-CM

## 2024-05-28 RX ORDER — HYDROCHLOROTHIAZIDE 25 MG/1
25 TABLET ORAL DAILY
Qty: 90 TABLET | Refills: 0 | Status: SHIPPED | OUTPATIENT
Start: 2024-05-28 | End: 2024-08-29

## 2024-08-20 ENCOUNTER — OFFICE VISIT (OUTPATIENT)
Dept: AUDIOLOGY | Facility: CLINIC | Age: 74
End: 2024-08-20
Attending: FAMILY MEDICINE
Payer: COMMERCIAL

## 2024-08-20 DIAGNOSIS — H91.93 DECREASED HEARING OF BOTH EARS: ICD-10-CM

## 2024-08-20 DIAGNOSIS — H90.3 SENSORINEURAL HEARING LOSS, BILATERAL: Primary | ICD-10-CM

## 2024-08-20 PROCEDURE — 92557 COMPREHENSIVE HEARING TEST: CPT | Performed by: AUDIOLOGIST

## 2024-08-20 PROCEDURE — 92567 TYMPANOMETRY: CPT | Performed by: AUDIOLOGIST

## 2024-08-20 NOTE — PROGRESS NOTES
AUDIOLOGY REPORT    BACKGROUND:  Referred by Dr. Arevalo, for concern regarding decreased hearing bilaterally. Patient notes difficulty hearing soft/ low talkers and hearing conversation without visual cues. She denied tinnitus, dizziness, and vertigo. She had a strok 4 years ago, she noted 2 falls this year without injury secondary to the stroke. She denied history of noise exposurse, no ear problems, and no ear surgeries.      RESULTS:  Otoscopy showed minimally visible eardrum right ear after moderate wax removed, left eardrum visible and canal clear of wax after significant wax was removed without incident using a lighted curette and micro forceps. Tympanograms showed normal eardrum movement and pressure right and left ear. Could not maintain seal to test reflexes. Pure tones showed low normal hearing sensitivity 250- 1k sloping to mild to severe high frequency sensorineural hearing loss 1. 5k- 8k bilaterally. Good SRT / PTA agreement. Good word understanding in quiet, 92% right and 88% left, 25- word lists.    RECOMMENDATIONS/PLAN:  Discussed results and recommended hearing aid consultation to discuss amplification options. She is a good candidate and is likely to benefit from hearing aids.            Danny Stratton Licensed Audiologist #[id number]

## 2024-08-28 DIAGNOSIS — I10 HTN, GOAL BELOW 130/80: ICD-10-CM

## 2024-08-28 DIAGNOSIS — I63.519 CEREBROVASCULAR ACCIDENT (CVA) DUE TO OCCLUSION OF MIDDLE CEREBRAL ARTERY, UNSPECIFIED BLOOD VESSEL LATERALITY (H): ICD-10-CM

## 2024-08-29 RX ORDER — HYDROCHLOROTHIAZIDE 25 MG/1
25 TABLET ORAL DAILY
Qty: 90 TABLET | Refills: 0 | Status: SHIPPED | OUTPATIENT
Start: 2024-08-29

## 2024-09-23 DIAGNOSIS — I10 HTN, GOAL BELOW 130/80: ICD-10-CM

## 2024-09-23 RX ORDER — AMLODIPINE BESYLATE 5 MG/1
5 TABLET ORAL DAILY
Qty: 90 TABLET | Refills: 0 | Status: SHIPPED | OUTPATIENT
Start: 2024-09-23

## 2024-09-26 DIAGNOSIS — I10 HTN, GOAL BELOW 130/80: ICD-10-CM

## 2024-09-26 RX ORDER — LISINOPRIL 40 MG/1
40 TABLET ORAL DAILY
Qty: 90 TABLET | Refills: 0 | Status: SHIPPED | OUTPATIENT
Start: 2024-09-26

## 2024-09-26 NOTE — TELEPHONE ENCOUNTER
Prescription approved per [facility] Refill Protocol.  Maite Malone RN  [facility]

## 2024-12-02 DIAGNOSIS — I10 HTN, GOAL BELOW 130/80: ICD-10-CM

## 2024-12-02 DIAGNOSIS — I63.519 CEREBROVASCULAR ACCIDENT (CVA) DUE TO OCCLUSION OF MIDDLE CEREBRAL ARTERY, UNSPECIFIED BLOOD VESSEL LATERALITY (H): ICD-10-CM

## 2024-12-02 RX ORDER — HYDROCHLOROTHIAZIDE 25 MG/1
25 TABLET ORAL DAILY
Qty: 30 TABLET | Refills: 0 | Status: SHIPPED | OUTPATIENT
Start: 2024-12-02

## 2024-12-11 ENCOUNTER — OFFICE VISIT (OUTPATIENT)
Dept: FAMILY MEDICINE | Facility: CLINIC | Age: 74
End: 2024-12-11
Payer: COMMERCIAL

## 2024-12-11 ENCOUNTER — TELEPHONE (OUTPATIENT)
Dept: FAMILY MEDICINE | Facility: CLINIC | Age: 74
End: 2024-12-11

## 2024-12-11 VITALS
RESPIRATION RATE: 18 BRPM | BODY MASS INDEX: 33.61 KG/M2 | DIASTOLIC BLOOD PRESSURE: 78 MMHG | SYSTOLIC BLOOD PRESSURE: 132 MMHG | OXYGEN SATURATION: 98 % | TEMPERATURE: 98.3 F | HEART RATE: 68 BPM | WEIGHT: 202 LBS

## 2024-12-11 DIAGNOSIS — I10 HTN, GOAL BELOW 130/80: ICD-10-CM

## 2024-12-11 DIAGNOSIS — Z29.11 NEED FOR VACCINATION AGAINST RESPIRATORY SYNCYTIAL VIRUS: ICD-10-CM

## 2024-12-11 DIAGNOSIS — I63.519 CEREBROVASCULAR ACCIDENT (CVA) DUE TO OCCLUSION OF MIDDLE CEREBRAL ARTERY, UNSPECIFIED BLOOD VESSEL LATERALITY (H): ICD-10-CM

## 2024-12-11 DIAGNOSIS — Z12.31 VISIT FOR SCREENING MAMMOGRAM: Primary | ICD-10-CM

## 2024-12-11 DIAGNOSIS — Z23 NEED FOR SHINGLES VACCINE: ICD-10-CM

## 2024-12-11 DIAGNOSIS — N18.31 CHRONIC KIDNEY DISEASE, STAGE 3A (H): ICD-10-CM

## 2024-12-11 LAB
ANION GAP SERPL CALCULATED.3IONS-SCNC: 12 MMOL/L (ref 7–15)
BUN SERPL-MCNC: 17.7 MG/DL (ref 8–23)
CALCIUM SERPL-MCNC: 9.5 MG/DL (ref 8.8–10.4)
CHLORIDE SERPL-SCNC: 95 MMOL/L (ref 98–107)
CREAT SERPL-MCNC: 1.08 MG/DL (ref 0.51–0.95)
EGFRCR SERPLBLD CKD-EPI 2021: 54 ML/MIN/1.73M2
GLUCOSE SERPL-MCNC: 87 MG/DL (ref 70–99)
HCO3 SERPL-SCNC: 23 MMOL/L (ref 22–29)
HGB BLD-MCNC: 13.8 G/DL (ref 11.7–15.7)
POTASSIUM SERPL-SCNC: 4.5 MMOL/L (ref 3.4–5.3)
SODIUM SERPL-SCNC: 130 MMOL/L (ref 135–145)

## 2024-12-11 PROCEDURE — 36415 COLL VENOUS BLD VENIPUNCTURE: CPT | Performed by: FAMILY MEDICINE

## 2024-12-11 PROCEDURE — 85018 HEMOGLOBIN: CPT | Performed by: FAMILY MEDICINE

## 2024-12-11 PROCEDURE — 80048 BASIC METABOLIC PNL TOTAL CA: CPT | Performed by: FAMILY MEDICINE

## 2024-12-11 RX ORDER — HYDROCHLOROTHIAZIDE 25 MG/1
25 TABLET ORAL DAILY
Qty: 90 TABLET | Refills: 1 | Status: SHIPPED | OUTPATIENT
Start: 2024-12-11

## 2024-12-11 RX ORDER — AMLODIPINE BESYLATE 5 MG/1
5 TABLET ORAL DAILY
Qty: 90 TABLET | Refills: 1 | Status: SHIPPED | OUTPATIENT
Start: 2024-12-11

## 2024-12-11 ASSESSMENT — PAIN SCALES - GENERAL: PAINLEVEL_OUTOF10: NO PAIN (0)

## 2024-12-11 NOTE — PROGRESS NOTES
{PROVIDER CHARTING PREFERENCE:315647}    Misti Menjivar is a 74 year old, presenting for the following health issues:  Recheck Medication        12/11/2024     9:42 AM   Additional Questions   Roomed by Elin LEACH     History of Present Illness       Hyperlipidemia:  She presents for follow up of hyperlipidemia.   She is taking medication to lower cholesterol. She is not having myalgia or other side effects to statin medications.    Hypertension: She presents for follow up of hypertension.  She does not check blood pressure  regularly outside of the clinic.  She follows a low salt diet.     Reason for visit:  Medication check        {MA/LPN/RN Pre-Provider Visit Orders- hCG/UA/Strep (Optional):686422}  {SUPERLIST (Optional):899709}  {additonal problems for provider to add (Optional):163677}    {ROS Picklists (Optional):443875}      Objective    /78   Pulse 68   Temp 98.3  F (36.8  C) (Temporal)   Resp 18   Wt 91.6 kg (202 lb)   LMP  (LMP Unknown)   SpO2 98%   BMI 33.61 kg/m    Body mass index is 33.61 kg/m .  Physical Exam   {Exam List (Optional):412991}    {Diagnostic Test Results (Optional):343951}        Signed Electronically by: Deo Love Mai, MD  {Email feedback regarding this note to [email or web address]   :[id number]}

## 2024-12-11 NOTE — TELEPHONE ENCOUNTER
----- Message from Deo Love Mai sent at 12/11/2024 12:32 PM CST -----  Please let patient know that her sodium is slightly low, likely due to hydrochlorothiazide side effect.  She does not have to be on a low-salt diet.  Her other electrolytes were normal.  Kidney function is overall about the same, unchanged from 8 months ago.  She is not anemic.  No change in medication.  Thank you

## 2024-12-18 DIAGNOSIS — I63.519 CEREBROVASCULAR ACCIDENT (CVA) DUE TO OCCLUSION OF MIDDLE CEREBRAL ARTERY, UNSPECIFIED BLOOD VESSEL LATERALITY (H): ICD-10-CM

## 2024-12-18 RX ORDER — ATORVASTATIN CALCIUM 40 MG/1
40 TABLET, FILM COATED ORAL EVERY EVENING
Qty: 90 TABLET | Refills: 0 | Status: SHIPPED | OUTPATIENT
Start: 2024-12-18

## 2024-12-21 DIAGNOSIS — I10 HTN, GOAL BELOW 130/80: ICD-10-CM

## 2024-12-23 RX ORDER — LISINOPRIL 40 MG/1
40 TABLET ORAL DAILY
Qty: 90 TABLET | Refills: 2 | Status: SHIPPED | OUTPATIENT
Start: 2024-12-23

## 2025-06-23 DIAGNOSIS — I10 HTN, GOAL BELOW 130/80: ICD-10-CM

## 2025-06-23 DIAGNOSIS — I63.519 CEREBROVASCULAR ACCIDENT (CVA) DUE TO OCCLUSION OF MIDDLE CEREBRAL ARTERY, UNSPECIFIED BLOOD VESSEL LATERALITY (H): ICD-10-CM

## 2025-06-23 RX ORDER — AMLODIPINE BESYLATE 5 MG/1
5 TABLET ORAL DAILY
Qty: 90 TABLET | Refills: 1 | Status: SHIPPED | OUTPATIENT
Start: 2025-06-23

## 2025-06-23 RX ORDER — HYDROCHLOROTHIAZIDE 25 MG/1
25 TABLET ORAL DAILY
Qty: 90 TABLET | Refills: 1 | Status: SHIPPED | OUTPATIENT
Start: 2025-06-23